# Patient Record
Sex: MALE | Race: WHITE | NOT HISPANIC OR LATINO | Employment: OTHER | RURAL
[De-identification: names, ages, dates, MRNs, and addresses within clinical notes are randomized per-mention and may not be internally consistent; named-entity substitution may affect disease eponyms.]

---

## 2022-10-18 ENCOUNTER — OFFICE VISIT (OUTPATIENT)
Dept: INTERNAL MEDICINE | Facility: CLINIC | Age: 72
End: 2022-10-18
Payer: MEDICARE

## 2022-10-18 VITALS
WEIGHT: 182 LBS | RESPIRATION RATE: 16 BRPM | BODY MASS INDEX: 25.48 KG/M2 | OXYGEN SATURATION: 97 % | HEART RATE: 83 BPM | DIASTOLIC BLOOD PRESSURE: 60 MMHG | HEIGHT: 71 IN | SYSTOLIC BLOOD PRESSURE: 125 MMHG

## 2022-10-18 DIAGNOSIS — Z86.73 HISTORY OF STROKE: ICD-10-CM

## 2022-10-18 DIAGNOSIS — S76.311A STRAIN OF RIGHT HAMSTRING MUSCLE, INITIAL ENCOUNTER: ICD-10-CM

## 2022-10-18 DIAGNOSIS — E03.9 HYPOTHYROIDISM, UNSPECIFIED TYPE: ICD-10-CM

## 2022-10-18 DIAGNOSIS — E78.5 DYSLIPIDEMIA: ICD-10-CM

## 2022-10-18 DIAGNOSIS — R47.01 EXPRESSIVE APHASIA: ICD-10-CM

## 2022-10-18 DIAGNOSIS — Z76.89 ENCOUNTER TO ESTABLISH CARE WITH NEW DOCTOR: Primary | ICD-10-CM

## 2022-10-18 DIAGNOSIS — Z85.528 HISTORY OF RENAL CELL CARCINOMA: ICD-10-CM

## 2022-10-18 PROCEDURE — 99204 PR OFFICE/OUTPT VISIT, NEW, LEVL IV, 45-59 MIN: ICD-10-PCS | Mod: S$PBB,25,, | Performed by: INTERNAL MEDICINE

## 2022-10-18 PROCEDURE — 99204 OFFICE O/P NEW MOD 45 MIN: CPT | Mod: S$PBB,25,, | Performed by: INTERNAL MEDICINE

## 2022-10-18 PROCEDURE — G0008 ADMIN INFLUENZA VIRUS VAC: HCPCS | Mod: PBBFAC | Performed by: INTERNAL MEDICINE

## 2022-10-18 PROCEDURE — 99205 OFFICE O/P NEW HI 60 MIN: CPT | Mod: PBBFAC,25 | Performed by: INTERNAL MEDICINE

## 2022-10-18 PROCEDURE — 96372 THER/PROPH/DIAG INJ SC/IM: CPT | Mod: PBBFAC,59 | Performed by: INTERNAL MEDICINE

## 2022-10-18 RX ORDER — ATORVASTATIN CALCIUM 80 MG/1
80 TABLET, FILM COATED ORAL DAILY
COMMUNITY

## 2022-10-18 RX ORDER — ACETAMINOPHEN 325 MG/1
325 TABLET ORAL EVERY 6 HOURS PRN
COMMUNITY

## 2022-10-18 RX ORDER — LEVOTHYROXINE SODIUM 150 UG/1
150 TABLET ORAL EVERY OTHER DAY
COMMUNITY
End: 2022-12-19 | Stop reason: SDUPTHER

## 2022-10-18 RX ORDER — ALFUZOSIN HYDROCHLORIDE 10 MG/1
10 TABLET, EXTENDED RELEASE ORAL
COMMUNITY
End: 2023-06-26 | Stop reason: SDUPTHER

## 2022-10-18 RX ORDER — DEXAMETHASONE SODIUM PHOSPHATE 4 MG/ML
4 INJECTION, SOLUTION INTRA-ARTICULAR; INTRALESIONAL; INTRAMUSCULAR; INTRAVENOUS; SOFT TISSUE
Status: COMPLETED | OUTPATIENT
Start: 2022-10-18 | End: 2022-10-18

## 2022-10-18 RX ORDER — LEVOTHYROXINE SODIUM 137 UG/1
137 TABLET ORAL EVERY OTHER DAY
COMMUNITY
End: 2022-12-20 | Stop reason: SDUPTHER

## 2022-10-18 RX ORDER — CLOPIDOGREL BISULFATE 75 MG/1
75 TABLET ORAL DAILY
COMMUNITY
End: 2022-12-05 | Stop reason: SDUPTHER

## 2022-10-18 RX ORDER — ZINC GLUCONATE 50 MG
50 TABLET ORAL DAILY
COMMUNITY

## 2022-10-18 RX ORDER — FINASTERIDE 5 MG/1
5 TABLET, FILM COATED ORAL DAILY
COMMUNITY
End: 2022-11-17 | Stop reason: SDUPTHER

## 2022-10-18 RX ADMIN — METHYLPREDNISOLONE SODIUM SUCCINATE 40 MG: 40 INJECTION, POWDER, FOR SOLUTION INTRAMUSCULAR; INTRAVENOUS at 11:10

## 2022-10-18 RX ADMIN — DEXAMETHASONE SODIUM PHOSPHATE 4 MG: 4 INJECTION, SOLUTION INTRAMUSCULAR; INTRAVENOUS at 11:10

## 2022-10-18 NOTE — PROGRESS NOTES
Subjective:       Patient ID: Kerwin Wilson is a 72 y.o. female.    Chief Complaint: Establish Care (C/o left posterior thigh pain; )    The patient is a 73 yo male that presents today to establish care. He recently moved here from [place]. He has history of CVA with expressive aphasia, vascular dementia, dyslipidemia, hypothyroidism, RCC s/p right nephrectomy, and BPH. CVA was about 1 year ago. Not long after that a renal mass was found and the patient had right nephrectomy and some of the bladder removed. He could not tolerate chemo. Sai saw urology about 3-4 months ago. He is up to date on age appropriate health screenings. He complains of some left leg pain right at the gluteal cleft. The part that he says is tender is right at tendon insertion of one of the hamstring muscles. He does walk frequently but denies any injury. Today he is resting comfortably in no distress. He is afebrile and vital signs are stable.    Review of Systems   Constitutional:  Negative for appetite change, chills, fatigue and fever.   HENT:  Negative for nasal congestion, ear pain, hearing loss, sinus pressure/congestion and sore throat.    Eyes:  Negative for pain, redness and visual disturbance.   Respiratory:  Negative for apnea, cough, shortness of breath and wheezing.    Cardiovascular:  Negative for chest pain and palpitations.   Gastrointestinal:  Negative for abdominal pain, constipation, diarrhea and nausea.   Endocrine: Negative for cold intolerance, heat intolerance and polyuria.   Genitourinary:  Negative for dysuria and hematuria.   Musculoskeletal:  Positive for leg pain. Negative for arthralgias, back pain, joint swelling, myalgias and neck pain.   Integumentary:  Negative for pallor, rash and wound.   Allergic/Immunologic: Negative for immunocompromised state.   Neurological:  Positive for speech difficulty and memory loss. Negative for tremors, seizures, weakness and headaches.   Hematological:  Negative for  adenopathy.   Psychiatric/Behavioral:  Negative for confusion, dysphoric mood and sleep disturbance. The patient is not nervous/anxious.        Objective:      Physical Exam  Vitals and nursing note reviewed.   Constitutional:       General: She is not in acute distress.     Appearance: Normal appearance. She is not ill-appearing.   HENT:      Head: Normocephalic and atraumatic.      Right Ear: External ear normal.      Left Ear: External ear normal.      Nose: Nose normal.      Mouth/Throat:      Pharynx: Oropharynx is clear.   Eyes:      Extraocular Movements: Extraocular movements intact.      Conjunctiva/sclera: Conjunctivae normal.      Pupils: Pupils are equal, round, and reactive to light.   Neck:      Vascular: No carotid bruit.   Cardiovascular:      Rate and Rhythm: Normal rate and regular rhythm.      Pulses: Normal pulses.      Heart sounds: Normal heart sounds. No murmur heard.  Pulmonary:      Effort: No respiratory distress.      Breath sounds: Normal breath sounds. No wheezing or rales.   Abdominal:      General: Bowel sounds are normal.      Palpations: Abdomen is soft.   Musculoskeletal:         General: Normal range of motion.      Cervical back: Normal range of motion and neck supple.      Right lower leg: No edema.      Left lower leg: No edema.   Skin:     General: Skin is warm and dry.      Capillary Refill: Capillary refill takes less than 2 seconds.      Coloration: Skin is not pale.   Neurological:      General: No focal deficit present.      Mental Status: She is alert and oriented to person, place, and time.      Cranial Nerves: No cranial nerve deficit.      Sensory: No sensory deficit.      Motor: No weakness.      Gait: Gait normal.      Comments: Some expressive aphasia   Psychiatric:         Mood and Affect: Mood normal.         Judgment: Judgment normal.       Assessment:       Problem List Items Addressed This Visit          Neuro    History of stroke    Expressive aphasia        Cardiac/Vascular    Dyslipidemia    Relevant Orders    Lipid Panel       Oncology    History of renal cell carcinoma    Relevant Orders    CBC Auto Differential (Completed)    Comprehensive Metabolic Panel    Ambulatory referral/consult to Urology       Endocrine    Hypothyroidism       Orthopedic    Right hamstring muscle strain     Other Visit Diagnoses       Encounter to establish care with new doctor    -  Primary              Plan:       The patient presents today to establish care. He recently moved her from [place]. He has some family here. He is up to date on age appropriate health maintenance issues. We are going to check some lab work today.    2. Hx of CVA- with residual expressive aphasia. He is on plavix, statin. No focal deficit and he is exercising frequently. He also has some dementia that is thought to be vascular.     3. Dyslipidemia- the patient is on high intensity statin. Continue with current care    4. Hypothyroidism- He is on alternating doses of synthroid 137 mcg and 150 mcg every other day. Continue with current care. Appears euthyroid clinically.    5. History of renal cell carcinoma- s/p nephrectomy on the right and some of his bladder also resected. He will need to establish with urology here so we will make that referral. He is on uroxatral 10 mg daily and proscar 5 mg daily    6. Right hamstring muscle strain. Has an allergy to NSAIDS. Will treat with steroids to see if this will help with inflammation. Ice as needed, good stretching, and rest as needed    RTC in 4 months

## 2022-11-03 ENCOUNTER — TELEPHONE (OUTPATIENT)
Dept: INTERNAL MEDICINE | Facility: CLINIC | Age: 72
End: 2022-11-03
Payer: MEDICARE

## 2022-11-03 NOTE — TELEPHONE ENCOUNTER
----- Message from Jessica Fuentes sent at 11/3/2022 11:12 AM CDT -----  Patient sister Lenore Brar called  to follow up on referral for neurology. Please call # [phone]

## 2022-11-03 NOTE — TELEPHONE ENCOUNTER
Called urology regarding referral. Appt date and time was given. Pts sister was called and notified of appt for 12/01/2022 @1300 with KRISTIN Otero NP.

## 2022-11-09 DIAGNOSIS — Z71.89 COMPLEX CARE COORDINATION: ICD-10-CM

## 2022-11-17 RX ORDER — FINASTERIDE 5 MG/1
5 TABLET, FILM COATED ORAL DAILY
Qty: 90 TABLET | Refills: 3 | Status: SHIPPED | OUTPATIENT
Start: 2022-11-17

## 2022-11-28 ENCOUNTER — TELEPHONE (OUTPATIENT)
Dept: INTERNAL MEDICINE | Facility: CLINIC | Age: 72
End: 2022-11-28

## 2022-11-28 DIAGNOSIS — M77.9 TENDONITIS: Primary | ICD-10-CM

## 2022-11-28 NOTE — TELEPHONE ENCOUNTER
Pts sister stated he has been taking tylenol.  States the pain is left posterior upper leg. After speaking with Dr. Leblanc, we are referring to ortho. Pts sister notified.

## 2022-11-28 NOTE — TELEPHONE ENCOUNTER
----- Message from Jessica Fuentes sent at 11/28/2022  3:32 PM CST -----  Patient sister Pat need a nurse to give her a call concerning her brother.

## 2022-12-02 ENCOUNTER — HOSPITAL ENCOUNTER (OUTPATIENT)
Dept: RADIOLOGY | Facility: HOSPITAL | Age: 72
Discharge: HOME | End: 2022-12-02
Attending: ORTHOPAEDIC SURGERY
Payer: MEDICARE

## 2022-12-02 DIAGNOSIS — M79.605 LEG PAIN, LEFT: ICD-10-CM

## 2022-12-02 DIAGNOSIS — M79.604 LEG PAIN, RIGHT: ICD-10-CM

## 2022-12-02 DIAGNOSIS — D89.2 SERUM GAMMA GLOBULIN INCREASED: Primary | ICD-10-CM

## 2022-12-02 PROBLEM — M25.552 LEFT HIP PAIN: Status: ACTIVE | Noted: 2022-12-02

## 2022-12-02 PROCEDURE — 73552 X-RAY EXAM OF FEMUR 2/>: CPT | Mod: TC,LT

## 2022-12-05 RX ORDER — CLOPIDOGREL BISULFATE 75 MG/1
75 TABLET ORAL DAILY
Qty: 90 TABLET | Refills: 3 | Status: SHIPPED | OUTPATIENT
Start: 2022-12-05

## 2022-12-09 ENCOUNTER — HOSPITAL ENCOUNTER (OUTPATIENT)
Dept: RADIOLOGY | Facility: HOSPITAL | Age: 72
Discharge: HOME | End: 2022-12-09
Attending: ORTHOPAEDIC SURGERY
Payer: MEDICARE

## 2022-12-09 DIAGNOSIS — M25.552 LEFT HIP PAIN: ICD-10-CM

## 2022-12-09 PROCEDURE — 78306 BONE IMAGING WHOLE BODY: CPT | Mod: TC

## 2022-12-09 PROCEDURE — 78306 NM BONE SCAN WHOLE BODY: ICD-10-PCS | Mod: 26,,, | Performed by: STUDENT IN AN ORGANIZED HEALTH CARE EDUCATION/TRAINING PROGRAM

## 2022-12-09 PROCEDURE — 78306 BONE IMAGING WHOLE BODY: CPT | Mod: 26,,, | Performed by: STUDENT IN AN ORGANIZED HEALTH CARE EDUCATION/TRAINING PROGRAM

## 2022-12-09 PROCEDURE — A9503 TC99M MEDRONATE: HCPCS

## 2022-12-19 RX ORDER — LEVOTHYROXINE SODIUM 150 UG/1
150 TABLET ORAL EVERY OTHER DAY
Qty: 90 TABLET | Refills: 3 | Status: SHIPPED | OUTPATIENT
Start: 2022-12-19

## 2022-12-19 NOTE — TELEPHONE ENCOUNTER
----- Message from Jessica Fuentes sent at 12/16/2022  1:31 PM CST -----  NEED REFILL ON LEVOTHYROXINE

## 2022-12-20 RX ORDER — LEVOTHYROXINE SODIUM 137 UG/1
137 TABLET ORAL EVERY OTHER DAY
Qty: 45 TABLET | Refills: 3 | Status: SHIPPED | OUTPATIENT
Start: 2022-12-20

## 2022-12-29 ENCOUNTER — OFFICE VISIT (OUTPATIENT)
Dept: UROLOGY | Facility: CLINIC | Age: 72
End: 2022-12-29
Payer: MEDICARE

## 2022-12-29 VITALS
OXYGEN SATURATION: 96 % | TEMPERATURE: 98 F | HEART RATE: 100 BPM | HEIGHT: 72 IN | BODY MASS INDEX: 22.35 KG/M2 | SYSTOLIC BLOOD PRESSURE: 100 MMHG | DIASTOLIC BLOOD PRESSURE: 64 MMHG | WEIGHT: 165 LBS

## 2022-12-29 DIAGNOSIS — Z90.5 HISTORY OF RIGHT NEPHRECTOMY: ICD-10-CM

## 2022-12-29 DIAGNOSIS — M79.605 LEFT LEG PAIN: ICD-10-CM

## 2022-12-29 DIAGNOSIS — Z85.528 HISTORY OF RENAL CELL CARCINOMA: Primary | ICD-10-CM

## 2022-12-29 DIAGNOSIS — N28.89 OTHER SPECIFIED DISORDERS OF KIDNEY AND URETER: ICD-10-CM

## 2022-12-29 PROCEDURE — 3078F PR MOST RECENT DIASTOLIC BLOOD PRESSURE < 80 MM HG: ICD-10-PCS | Mod: CPTII,,, | Performed by: NURSE PRACTITIONER

## 2022-12-29 PROCEDURE — 3078F DIAST BP <80 MM HG: CPT | Mod: CPTII,,, | Performed by: NURSE PRACTITIONER

## 2022-12-29 PROCEDURE — 3008F PR BODY MASS INDEX (BMI) DOCUMENTED: ICD-10-PCS | Mod: CPTII,,, | Performed by: NURSE PRACTITIONER

## 2022-12-29 PROCEDURE — 99215 OFFICE O/P EST HI 40 MIN: CPT | Mod: PBBFAC | Performed by: NURSE PRACTITIONER

## 2022-12-29 PROCEDURE — 1160F RVW MEDS BY RX/DR IN RCRD: CPT | Mod: CPTII,,, | Performed by: NURSE PRACTITIONER

## 2022-12-29 PROCEDURE — 87086 CULTURE, URINE: ICD-10-PCS | Mod: GZ,,, | Performed by: CLINICAL MEDICAL LABORATORY

## 2022-12-29 PROCEDURE — 3008F BODY MASS INDEX DOCD: CPT | Mod: CPTII,,, | Performed by: NURSE PRACTITIONER

## 2022-12-29 PROCEDURE — 81001 URINALYSIS AUTO W/SCOPE: CPT | Mod: ,,, | Performed by: CLINICAL MEDICAL LABORATORY

## 2022-12-29 PROCEDURE — 81001 URINALYSIS, REFLEX TO URINE CULTURE: ICD-10-PCS | Mod: ,,, | Performed by: CLINICAL MEDICAL LABORATORY

## 2022-12-29 PROCEDURE — 99203 PR OFFICE/OUTPT VISIT, NEW, LEVL III, 30-44 MIN: ICD-10-PCS | Mod: S$PBB,,, | Performed by: NURSE PRACTITIONER

## 2022-12-29 PROCEDURE — 99203 OFFICE O/P NEW LOW 30 MIN: CPT | Mod: S$PBB,,, | Performed by: NURSE PRACTITIONER

## 2022-12-29 PROCEDURE — 3074F SYST BP LT 130 MM HG: CPT | Mod: CPTII,,, | Performed by: NURSE PRACTITIONER

## 2022-12-29 PROCEDURE — 1160F PR REVIEW ALL MEDS BY PRESCRIBER/CLIN PHARMACIST DOCUMENTED: ICD-10-PCS | Mod: CPTII,,, | Performed by: NURSE PRACTITIONER

## 2022-12-29 PROCEDURE — 3074F PR MOST RECENT SYSTOLIC BLOOD PRESSURE < 130 MM HG: ICD-10-PCS | Mod: CPTII,,, | Performed by: NURSE PRACTITIONER

## 2022-12-29 PROCEDURE — 1159F MED LIST DOCD IN RCRD: CPT | Mod: CPTII,,, | Performed by: NURSE PRACTITIONER

## 2022-12-29 PROCEDURE — 87086 URINE CULTURE/COLONY COUNT: CPT | Mod: GZ,,, | Performed by: CLINICAL MEDICAL LABORATORY

## 2022-12-29 PROCEDURE — 1159F PR MEDICATION LIST DOCUMENTED IN MEDICAL RECORD: ICD-10-PCS | Mod: CPTII,,, | Performed by: NURSE PRACTITIONER

## 2022-12-29 RX ORDER — GENTAMICIN SULFATE 40 MG/ML
80 INJECTION, SOLUTION INTRAMUSCULAR; INTRAVENOUS ONCE
Status: DISCONTINUED | OUTPATIENT
Start: 2022-12-30 | End: 2022-12-29

## 2022-12-29 NOTE — ASSESSMENT & PLAN NOTE
· Consult with Dr Modi Urology ASAP  · F/u with me after CT Abdomen with IV contrast to review  · F/u with Dr. Wiggins

## 2022-12-29 NOTE — PROGRESS NOTES
Mr. Wilson is a pleasant 73 yo gentleman who has been referred for abnormal bone scan recently obtained which is worrisome for metastatic disease.  He has a known history of right Renal Cell Cancer,  new findings worrisome for his left kidney.  Right nephrectomy was performed at [facility] in [facility] January of this year.  Patient moved to [place] in September of this year.  He obtained appt with Dr. Leblanc for left hip and upper leg pain, which resulted in an orthopedic consult with Dr. Freeman, Orthopedics, who obtained MRI and then Bone Scan  Patient states he had CVA last year this time, and that the Renal Cancer was found incidentally during that hospitalization.  He has had a consult with Dr. Wiggins, Oncology, who feels that   Has appt for PET Scan and is to begin radiation for this.  Patient denies hematuria or left renal pain.  Patient states that up until November, he was walking every day at [facility] and had a sudden onset of this left hip and pelvic pain.        NM BONE SCAN WHOLE BODY     COMPARISON:  12/2/22     FINDINGS:  Focal radiotracer uptake is associated with the right inferior bony calvarium, nonspecific.  CT of the brain without contrast or skull radiographs recommended.     Focal radiotracer uptake overlying the anterior cervical spine.     There is focal radiotracer uptake associated with the medial right 8th rib.  Focal radiotracer uptake associated with the medial left 7th rib.     Focal radiotracer uptake associated with the posterior L4 vertebral body.     There is abnormal radiotracer uptake associated with the left renal collecting system.  There is radiotracer uptake within the left ureter and urinary bladder.     There is no radiotracer uptake associated with the right kidney.     Focal radiotracer uptake is associated with the anterior/posterior left-sided pubic bone.     Degenerative uptake of the knees and left foot.     Impression:     Focal radiotracer uptake is  "associated with the right inferior bony calvarium, nonspecific.  CT of the brain without contrast or skull radiographs recommended.     Multifocal radiotracer uptake associated with overlying the anterior cervical spine, multiple ribs, the posterior lumbar spine and the left pubic bone, findings which can be seen in metastatic disease.  CT chest, abdomen and pelvis with intravenous contrast this recommended.     Abnormal radiotracer uptake associated with the left renal collecting system.  CT of the abdomen and pelvis with intravenous contrast is recommended.        Electronically signed by: Juan Pablo Smith  Date:                                            12/09/2022  Time:                                           16:17    ------------------------------------------------------------------    MRI imaging 12/2/22 by Orthopedics:  "Abnormal signal involving the posterior acetabulum, ischium and inferior pubic ramus.  There is extension of signal abnormality into the adjacent soft tissues beyond the cortical borders, findings consistent with malignant process.  When correlated bone scan this could represent metastatic disease from another lesion versus primary bone tumor.  There is edema in the adjacent muscles and moderate hip effusion."  "

## 2022-12-30 ENCOUNTER — TELEPHONE (OUTPATIENT)
Dept: UROLOGY | Facility: CLINIC | Age: 72
End: 2022-12-30

## 2022-12-30 LAB
BACTERIA #/AREA URNS HPF: ABNORMAL /HPF
BILIRUB UR QL STRIP: NEGATIVE
CLARITY UR: ABNORMAL
COLOR UR: YELLOW
GLUCOSE UR STRIP-MCNC: NORMAL MG/DL
KETONES UR STRIP-SCNC: NEGATIVE MG/DL
LEUKOCYTE ESTERASE UR QL STRIP: ABNORMAL
NITRITE UR QL STRIP: NEGATIVE
PH UR STRIP: 6 PH UNITS
PROT UR QL STRIP: NEGATIVE
RBC # UR STRIP: NEGATIVE /UL
RBC #/AREA URNS HPF: 4 /HPF
SP GR UR STRIP: 1.01
SQUAMOUS #/AREA URNS LPF: ABNORMAL /HPF
UROBILINOGEN UR STRIP-ACNC: NORMAL MG/DL
WBC #/AREA URNS HPF: >182 /HPF
WBC CLUMPS, UA: ABNORMAL /HPF

## 2022-12-30 NOTE — TELEPHONE ENCOUNTER
----- Message from SID Ford sent at 12/30/2022  8:19 AM CST -----  Sodium is low, recommend adding table salt to diet and f/u with PCP for this.  Glucose is elevated, f/u with PCP  Renal function is still abnormal but improved slightly since last checked 2 months ago.  Stay well hydrated   Calcium is high, will forward study to Oncology for their review.  I called Dr Wiggins's office and spoke with Nuha and got fax number [phone] and faxed this report to him for his review.

## 2022-12-30 NOTE — TELEPHONE ENCOUNTER
----- Message from SID Ford sent at 12/30/2022  8:19 AM CST -----  Sodium is low, recommend adding table salt to diet and f/u with PCP for this.  Glucose is elevated, f/u with PCP  Renal function is still abnormal but improved slightly since last checked 2 months ago.  Stay well hydrated   Calcium is high, will forward study to Oncology for their review.  I called and spoke with the sister CG.  Informed her of the above message.  Told her we will send the labs to Dr Wiggins for review.  She voiced understanding.  She said she will be glad to add a little salt to his food, because he is always telling her no to add much.  She said she will relay the information to the pt.

## 2023-01-01 LAB — UA COMPLETE W REFLEX CULTURE PNL UR: NO GROWTH

## 2023-01-03 ENCOUNTER — TELEPHONE (OUTPATIENT)
Dept: UROLOGY | Facility: CLINIC | Age: 73
End: 2023-01-03

## 2023-01-03 NOTE — TELEPHONE ENCOUNTER
----- Message from SID Ford sent at 1/3/2023  8:04 AM CST -----  No UTI per culture.  I called pt and spoke with his sister and gave her the information above:  no UTI.  She voiced understanding.

## 2023-01-10 ENCOUNTER — HOSPITAL ENCOUNTER (OUTPATIENT)
Dept: RADIOLOGY | Facility: HOSPITAL | Age: 73
Discharge: HOME | End: 2023-01-10
Attending: NURSE PRACTITIONER
Payer: MEDICARE

## 2023-01-10 DIAGNOSIS — N28.89 OTHER SPECIFIED DISORDERS OF KIDNEY AND URETER: ICD-10-CM

## 2023-01-10 PROCEDURE — 25500020 PHARM REV CODE 255: Performed by: NURSE PRACTITIONER

## 2023-01-10 PROCEDURE — 74177 CT ABD & PELVIS W/CONTRAST: CPT | Mod: 26,,, | Performed by: RADIOLOGY

## 2023-01-10 PROCEDURE — 74177 CT ABDOMEN PELVIS WITH CONTRAST: ICD-10-PCS | Mod: 26,,, | Performed by: RADIOLOGY

## 2023-01-10 PROCEDURE — 74177 CT ABD & PELVIS W/CONTRAST: CPT | Mod: TC

## 2023-01-10 RX ADMIN — IOPAMIDOL 50 ML: 755 INJECTION, SOLUTION INTRAVENOUS at 09:01

## 2023-01-11 ENCOUNTER — TELEPHONE (OUTPATIENT)
Dept: UROLOGY | Facility: CLINIC | Age: 73
End: 2023-01-11

## 2023-01-11 NOTE — TELEPHONE ENCOUNTER
----- Message from SID Ford sent at 1/11/2023  3:28 PM CST -----  Alert patient that CT imaging shows multiple liver lesions;  he has left hydronephrosis which needs stenting.  I have contacted Dr. Modi's office to see if he can be worked in asap, as this requires attention sooner than patient's scheduled appt on Feb. 6th.  I called and spoke with the pt sister and relayed the above information to her.  She voiced understanding.

## 2023-01-11 NOTE — TELEPHONE ENCOUNTER
----- Message from SID Ford sent at 1/11/2023  6:37 AM CST -----  -    -  Please check with patient to see if he has appt with Dr. Luther.  Please let me know so I know how to proceed.  I called and spoke with sister Ms Sanches and she says he does have appointment with Dr luther on 2-6-2023.  I will relay this to TRISH Otero.

## 2023-01-12 LAB — CREAT SERPL-MCNC: 1.6 MG/DL (ref 0.6–1.3)

## 2023-01-20 ENCOUNTER — TELEPHONE (OUTPATIENT)
Dept: INTERNAL MEDICINE | Facility: CLINIC | Age: 73
End: 2023-01-20

## 2023-01-20 NOTE — TELEPHONE ENCOUNTER
Spoke to pts granddaughter who is DPOA for mr. Wilson. She stated since she sent the fax she has been able to communicate with pts sister and wanted to give more info on pts hx. She states he has been diagnosed with dementia before he moved down here. She stated she is going to send the records to Dr. Leblanc so we can have this. She is going to speak to the sister about getting access to [employer]. If she is unable to get it we should be able to to add her proxy access. She is concerned about him getting chemo/ radiation with him dementia dx. I explained to her that we can get these records sent to oncologist once we have received them. She thanked me.

## 2023-02-23 ENCOUNTER — OFFICE VISIT (OUTPATIENT)
Dept: INTERNAL MEDICINE | Facility: CLINIC | Age: 73
End: 2023-02-23
Payer: MEDICARE

## 2023-02-23 VITALS
RESPIRATION RATE: 18 BRPM | HEART RATE: 72 BPM | DIASTOLIC BLOOD PRESSURE: 52 MMHG | BODY MASS INDEX: 22.35 KG/M2 | OXYGEN SATURATION: 100 % | TEMPERATURE: 98 F | HEIGHT: 72 IN | WEIGHT: 165 LBS | SYSTOLIC BLOOD PRESSURE: 107 MMHG

## 2023-02-23 DIAGNOSIS — Z09 FOLLOW-UP EXAM: Primary | ICD-10-CM

## 2023-02-23 DIAGNOSIS — E78.5 DYSLIPIDEMIA: ICD-10-CM

## 2023-02-23 DIAGNOSIS — R47.01 EXPRESSIVE APHASIA: ICD-10-CM

## 2023-02-23 DIAGNOSIS — E03.9 HYPOTHYROIDISM, UNSPECIFIED TYPE: ICD-10-CM

## 2023-02-23 DIAGNOSIS — Z85.528 HISTORY OF RENAL CELL CARCINOMA: ICD-10-CM

## 2023-02-23 DIAGNOSIS — Z86.73 HISTORY OF STROKE: ICD-10-CM

## 2023-02-23 DIAGNOSIS — Z90.5 HISTORY OF RIGHT NEPHRECTOMY: ICD-10-CM

## 2023-02-23 DIAGNOSIS — M25.552 LEFT HIP PAIN: ICD-10-CM

## 2023-02-23 PROCEDURE — 3074F SYST BP LT 130 MM HG: CPT | Mod: CPTII,,, | Performed by: INTERNAL MEDICINE

## 2023-02-23 PROCEDURE — 99215 OFFICE O/P EST HI 40 MIN: CPT | Mod: PBBFAC,25 | Performed by: INTERNAL MEDICINE

## 2023-02-23 PROCEDURE — 1101F PT FALLS ASSESS-DOCD LE1/YR: CPT | Mod: CPTII,,, | Performed by: INTERNAL MEDICINE

## 2023-02-23 PROCEDURE — 3078F PR MOST RECENT DIASTOLIC BLOOD PRESSURE < 80 MM HG: ICD-10-PCS | Mod: CPTII,,, | Performed by: INTERNAL MEDICINE

## 2023-02-23 PROCEDURE — 3288F FALL RISK ASSESSMENT DOCD: CPT | Mod: CPTII,,, | Performed by: INTERNAL MEDICINE

## 2023-02-23 PROCEDURE — 99214 PR OFFICE/OUTPT VISIT, EST, LEVL IV, 30-39 MIN: ICD-10-PCS | Mod: S$PBB,,, | Performed by: INTERNAL MEDICINE

## 2023-02-23 PROCEDURE — 3008F BODY MASS INDEX DOCD: CPT | Mod: CPTII,,, | Performed by: INTERNAL MEDICINE

## 2023-02-23 PROCEDURE — 3074F PR MOST RECENT SYSTOLIC BLOOD PRESSURE < 130 MM HG: ICD-10-PCS | Mod: CPTII,,, | Performed by: INTERNAL MEDICINE

## 2023-02-23 PROCEDURE — 3078F DIAST BP <80 MM HG: CPT | Mod: CPTII,,, | Performed by: INTERNAL MEDICINE

## 2023-02-23 PROCEDURE — 1159F PR MEDICATION LIST DOCUMENTED IN MEDICAL RECORD: ICD-10-PCS | Mod: CPTII,,, | Performed by: INTERNAL MEDICINE

## 2023-02-23 PROCEDURE — 1159F MED LIST DOCD IN RCRD: CPT | Mod: CPTII,,, | Performed by: INTERNAL MEDICINE

## 2023-02-23 PROCEDURE — 96372 THER/PROPH/DIAG INJ SC/IM: CPT | Mod: PBBFAC | Performed by: INTERNAL MEDICINE

## 2023-02-23 PROCEDURE — 99214 OFFICE O/P EST MOD 30 MIN: CPT | Mod: S$PBB,,, | Performed by: INTERNAL MEDICINE

## 2023-02-23 PROCEDURE — 3008F PR BODY MASS INDEX (BMI) DOCUMENTED: ICD-10-PCS | Mod: CPTII,,, | Performed by: INTERNAL MEDICINE

## 2023-02-23 PROCEDURE — 1126F PR PAIN SEVERITY QUANTIFIED, NO PAIN PRESENT: ICD-10-PCS | Mod: CPTII,,, | Performed by: INTERNAL MEDICINE

## 2023-02-23 PROCEDURE — 1101F PR PT FALLS ASSESS DOC 0-1 FALLS W/OUT INJ PAST YR: ICD-10-PCS | Mod: CPTII,,, | Performed by: INTERNAL MEDICINE

## 2023-02-23 PROCEDURE — 1126F AMNT PAIN NOTED NONE PRSNT: CPT | Mod: CPTII,,, | Performed by: INTERNAL MEDICINE

## 2023-02-23 PROCEDURE — 3288F PR FALLS RISK ASSESSMENT DOCUMENTED: ICD-10-PCS | Mod: CPTII,,, | Performed by: INTERNAL MEDICINE

## 2023-02-23 RX ORDER — CYANOCOBALAMIN 1000 UG/ML
1000 INJECTION, SOLUTION INTRAMUSCULAR; SUBCUTANEOUS
Status: COMPLETED | OUTPATIENT
Start: 2023-02-23 | End: 2023-02-23

## 2023-02-23 RX ORDER — ONDANSETRON HYDROCHLORIDE 8 MG/1
TABLET, FILM COATED ORAL
COMMUNITY
Start: 2023-02-06 | End: 2023-06-26 | Stop reason: SDUPTHER

## 2023-02-23 RX ORDER — CEPHALEXIN 500 MG/1
500 CAPSULE ORAL EVERY 12 HOURS
Status: ON HOLD | COMMUNITY
Start: 2023-01-30 | End: 2023-06-28 | Stop reason: HOSPADM

## 2023-02-23 RX ORDER — HYDROCODONE BITARTRATE AND ACETAMINOPHEN 5; 325 MG/1; MG/1
1 TABLET ORAL EVERY 4 HOURS PRN
COMMUNITY
Start: 2023-01-30 | End: 2023-06-26

## 2023-02-23 RX ADMIN — CYANOCOBALAMIN 1000 MCG: 1000 INJECTION, SOLUTION INTRAMUSCULAR; SUBCUTANEOUS at 09:02

## 2023-02-23 NOTE — PROGRESS NOTES
Subjective:       Patient ID: Kerwin Wilson is a 73 y.o. male.    Chief Complaint: Follow-up (4 month F/U, received ten doses of radiation, 2 rounds of chemo, a port placed. Recent bladder surgery)    10/18/22- The patient is a 71 yo male that presents today to establish care. He recently moved here from [place]. He has history of CVA with expressive aphasia, vascular dementia, dyslipidemia, hypothyroidism, RCC s/p right nephrectomy, and BPH. CVA was about 1 year ago. Not long after that a renal mass was found and the patient had right nephrectomy and some of the bladder removed. He could not tolerate chemo. Las saw urology about 3-4 months ago. He is up to date on age appropriate health screenings. He complains of some left leg pain right at the gluteal cleft. The part that he says is tender is right at tendon insertion of one of the hamstring muscles. He does walk frequently but denies any injury. Today he is resting comfortably in no distress. He is afebrile and vital signs are stable.    2/23/23- Patient presents today for follow up. He has history of CVA with expressive aphasia, vascular dementia, dyslipidemia, hypothyroidism, RCC s/p right nephrectomy, and BPH. He is also currently undergoing radiation and chemotherapy for metastatic urothelial cancer. He does report some generalized weakness and fatigue since beginning this treatment. He complains of some intermittent constipation related to previously prescribed short-term Hydrocodone use that was managed well with daily Miralax. He has discontinued use of the Hydrocodone but does report some continued constipation over the past 2 days and states he resumed the Miralax this morning. He is resting comfortably during the exam and appears in no distress.  Appetite has decreased some and weight is down about 15 lb since I last saw him. He has now completed his radiation treatments.    Follow-up  Associated symptoms include fatigue and weakness. Pertinent  negatives include no abdominal pain, arthralgias, chest pain, chills, congestion, coughing, fever, headaches, joint swelling, myalgias, nausea, neck pain, rash or sore throat.   Review of Systems   Constitutional:  Positive for fatigue. Negative for appetite change, chills and fever.   HENT:  Negative for nasal congestion, ear pain, hearing loss, sinus pressure/congestion and sore throat.    Eyes:  Negative for pain, redness and visual disturbance.   Respiratory:  Negative for apnea, cough, shortness of breath and wheezing.    Cardiovascular:  Negative for chest pain and palpitations.   Gastrointestinal:  Negative for abdominal pain, constipation, diarrhea and nausea.   Endocrine: Negative for cold intolerance, heat intolerance and polyuria.   Genitourinary:  Negative for dysuria and hematuria.   Musculoskeletal:  Positive for leg pain. Negative for arthralgias, back pain, joint swelling, myalgias and neck pain.   Integumentary:  Negative for pallor, rash and wound.   Neurological:  Positive for weakness and memory loss. Negative for tremors, seizures and headaches.   Hematological:  Negative for adenopathy.   Psychiatric/Behavioral:  Negative for confusion, dysphoric mood and sleep disturbance. The patient is not nervous/anxious.        Objective:      Physical Exam  Vitals and nursing note reviewed.   Constitutional:       General: He is not in acute distress.     Appearance: Normal appearance. He is not ill-appearing.   HENT:      Head: Normocephalic and atraumatic.      Right Ear: External ear normal.      Left Ear: External ear normal.      Nose: Nose normal.      Mouth/Throat:      Pharynx: Oropharynx is clear.   Eyes:      Extraocular Movements: Extraocular movements intact.      Conjunctiva/sclera: Conjunctivae normal.      Pupils: Pupils are equal, round, and reactive to light.   Neck:      Vascular: No carotid bruit.   Cardiovascular:      Rate and Rhythm: Normal rate and regular rhythm.      Pulses:  Normal pulses.      Heart sounds: Normal heart sounds. No murmur heard.  Pulmonary:      Effort: No respiratory distress.      Breath sounds: Normal breath sounds. No wheezing or rales.   Abdominal:      General: Bowel sounds are normal.      Palpations: Abdomen is soft.   Musculoskeletal:         General: Normal range of motion.      Cervical back: Normal range of motion and neck supple.      Right lower leg: No edema.      Left lower leg: No edema.   Skin:     General: Skin is warm and dry.      Capillary Refill: Capillary refill takes less than 2 seconds.      Coloration: Skin is not pale.   Neurological:      General: No focal deficit present.      Mental Status: He is alert and oriented to person, place, and time.      Cranial Nerves: No cranial nerve deficit.      Sensory: No sensory deficit.      Gait: Gait normal.      Comments: Some expressive aphasia   Psychiatric:         Mood and Affect: Mood normal.         Judgment: Judgment normal.       Assessment:       Problem List Items Addressed This Visit          Neuro    History of stroke    Expressive aphasia       Cardiac/Vascular    Dyslipidemia       Renal/    History of right nephrectomy       Oncology    History of renal cell carcinoma       Endocrine    Hypothyroidism     Other Visit Diagnoses       Follow-up exam    -  Primary              Plan:       1.The patient presents today to establish care. He recently moved her from [place]. He has some family here. He has metastatic urothelial cancer.        2/23/23 Patient presents today for follow up accompanied by family. He is currently undergoing radiation and chemotherapy for metastatic cancer and does report some generalized weakness and fatigue during this visit.     2. Hx of CVA- with residual expressive aphasia. He is on plavix, statin. No focal deficit and he is exercising frequently. He also has some dementia that is thought to be vascular.     3. Dyslipidemia- the patient is on high  intensity statin. Continue with current care    4. Hypothyroidism- He is on alternating doses of synthroid 137 mcg and 150 mcg every other day. Continue with current care. Appears euthyroid clinically.    5. History of renal cell carcinoma- s/p nephrectomy on the right and some of his bladder also resected. He will need to establish with urology here so we will make that referral. He is on uroxatral 10 mg daily and proscar 5 mg daily  2/23- Workup revealed metastatic disease. He underwent palliative radiation to left hip and has received 2 rounds of Chemo with Dr. Wiggins. We have discussed supplementing nutrition with boost or ensure    6. Moderate protein/nutrition deficiency- lost 15 lb since October of 2022. We are going to give b12 injection today and if it helps can give them monthly. We have discussed supplementing meals with booster ensure.    RTC in 6 months

## 2023-03-28 ENCOUNTER — TELEPHONE (OUTPATIENT)
Dept: INTERNAL MEDICINE | Facility: CLINIC | Age: 73
End: 2023-03-28

## 2023-03-28 ENCOUNTER — CLINICAL SUPPORT (OUTPATIENT)
Dept: INTERNAL MEDICINE | Facility: CLINIC | Age: 73
End: 2023-03-28
Payer: MEDICARE

## 2023-03-28 DIAGNOSIS — E63.9 NUTRITIONAL DEFICIENCY: Primary | ICD-10-CM

## 2023-03-28 PROCEDURE — 96372 THER/PROPH/DIAG INJ SC/IM: CPT | Mod: PBBFAC | Performed by: INTERNAL MEDICINE

## 2023-03-28 PROCEDURE — 99212 OFFICE O/P EST SF 10 MIN: CPT | Mod: PBBFAC

## 2023-03-28 RX ORDER — CYANOCOBALAMIN 1000 UG/ML
1000 INJECTION, SOLUTION INTRAMUSCULAR; SUBCUTANEOUS
Status: COMPLETED | OUTPATIENT
Start: 2023-03-28 | End: 2023-03-28

## 2023-03-28 RX ADMIN — CYANOCOBALAMIN 1000 MCG: 1000 INJECTION, SOLUTION INTRAMUSCULAR at 11:03

## 2023-03-28 NOTE — TELEPHONE ENCOUNTER
----- Message from Jessica Fuentes sent at 3/27/2023  3:46 PM CDT -----  PATIENT NEED A B12 SHOT

## 2023-03-28 NOTE — PROGRESS NOTES
Pt here for b12 injection. Pt tolerated injection with no complaints. No adverse reactions noted. Pt is going to call back and let clinic know if he wants to continue doing these monthly.

## 2023-05-12 ENCOUNTER — HOSPITAL ENCOUNTER (OUTPATIENT)
Dept: RADIOLOGY | Facility: HOSPITAL | Age: 73
Discharge: HOME | End: 2023-05-12
Attending: INTERNAL MEDICINE
Payer: MEDICARE

## 2023-05-12 DIAGNOSIS — C66.1 MALIGNANT NEOPLASM OF RIGHT URETER: ICD-10-CM

## 2023-05-12 LAB — CREAT SERPL-MCNC: 1.4 MG/DL (ref 0.6–1.3)

## 2023-05-12 PROCEDURE — 74177 CT ABD & PELVIS W/CONTRAST: CPT | Mod: 26,,, | Performed by: RADIOLOGY

## 2023-05-12 PROCEDURE — 74177 CT ABD & PELVIS W/CONTRAST: CPT | Mod: TC

## 2023-05-12 PROCEDURE — 74177 CT ABDOMEN PELVIS WITH CONTRAST: ICD-10-PCS | Mod: 26,,, | Performed by: RADIOLOGY

## 2023-05-12 PROCEDURE — 25500020 PHARM REV CODE 255: Performed by: INTERNAL MEDICINE

## 2023-05-12 PROCEDURE — 82565 ASSAY OF CREATININE: CPT

## 2023-05-12 RX ADMIN — IOPAMIDOL 100 ML: 755 INJECTION, SOLUTION INTRAVENOUS at 09:05

## 2023-06-09 DIAGNOSIS — Z71.89 COMPLEX CARE COORDINATION: ICD-10-CM

## 2023-06-21 ENCOUNTER — TELEPHONE (OUTPATIENT)
Dept: INTERNAL MEDICINE | Facility: CLINIC | Age: 73
End: 2023-06-21
Payer: MEDICARE

## 2023-06-21 NOTE — TELEPHONE ENCOUNTER
----- Message from Jessica Fuentes sent at 6/21/2023  3:12 PM CDT -----  Patient wife called stating the pharmacy is completely out of stock isalfuzosin (UROXATRAL) 10 mg Tb24.  If possible what other med can he take. Please give her a call back

## 2023-06-26 ENCOUNTER — HOSPITAL ENCOUNTER (INPATIENT)
Facility: HOSPITAL | Age: 73
LOS: 2 days | Discharge: HOME | DRG: 682 | End: 2023-06-28
Attending: FAMILY MEDICINE | Admitting: FAMILY MEDICINE
Payer: MEDICARE

## 2023-06-26 ENCOUNTER — OFFICE VISIT (OUTPATIENT)
Dept: INTERNAL MEDICINE | Facility: CLINIC | Age: 73
DRG: 682 | End: 2023-06-26
Payer: MEDICARE

## 2023-06-26 VITALS
RESPIRATION RATE: 18 BRPM | DIASTOLIC BLOOD PRESSURE: 58 MMHG | HEIGHT: 72 IN | SYSTOLIC BLOOD PRESSURE: 104 MMHG | OXYGEN SATURATION: 96 % | TEMPERATURE: 97 F | BODY MASS INDEX: 22.38 KG/M2 | HEART RATE: 119 BPM

## 2023-06-26 DIAGNOSIS — Z86.73 HISTORY OF STROKE: ICD-10-CM

## 2023-06-26 DIAGNOSIS — Z85.528 HISTORY OF RENAL CELL CARCINOMA: ICD-10-CM

## 2023-06-26 DIAGNOSIS — E86.0 DEHYDRATION: ICD-10-CM

## 2023-06-26 DIAGNOSIS — E78.5 DYSLIPIDEMIA: ICD-10-CM

## 2023-06-26 DIAGNOSIS — K12.30 MUCOSITIS: Primary | ICD-10-CM

## 2023-06-26 DIAGNOSIS — E03.9 HYPOTHYROIDISM, UNSPECIFIED TYPE: ICD-10-CM

## 2023-06-26 DIAGNOSIS — Z09 FOLLOW-UP EXAM: Primary | ICD-10-CM

## 2023-06-26 DIAGNOSIS — R07.9 CHEST PAIN: ICD-10-CM

## 2023-06-26 DIAGNOSIS — R13.10 DYSPHAGIA, UNSPECIFIED TYPE: ICD-10-CM

## 2023-06-26 DIAGNOSIS — R47.01 EXPRESSIVE APHASIA: ICD-10-CM

## 2023-06-26 DIAGNOSIS — N17.9 AKI (ACUTE KIDNEY INJURY): ICD-10-CM

## 2023-06-26 DIAGNOSIS — E44.0 MODERATE PROTEIN-CALORIE MALNUTRITION: ICD-10-CM

## 2023-06-26 PROBLEM — N18.9 ACUTE-ON-CHRONIC KIDNEY INJURY: Status: ACTIVE | Noted: 2023-06-26

## 2023-06-26 PROBLEM — N40.0 BPH (BENIGN PROSTATIC HYPERPLASIA): Status: ACTIVE | Noted: 2023-06-26

## 2023-06-26 PROBLEM — D72.829 LEUKOCYTOSIS: Status: ACTIVE | Noted: 2023-06-26

## 2023-06-26 LAB
ALBUMIN SERPL BCP-MCNC: 2.3 G/DL (ref 3.5–5)
ALBUMIN/GLOB SERPL: 0.4 {RATIO}
ALP SERPL-CCNC: 499 U/L (ref 45–115)
ALT SERPL W P-5'-P-CCNC: 113 U/L (ref 16–61)
ANION GAP SERPL CALCULATED.3IONS-SCNC: 21 MMOL/L (ref 7–16)
ANISOCYTOSIS BLD QL SMEAR: ABNORMAL
AST SERPL W P-5'-P-CCNC: 150 U/L (ref 15–37)
BASOPHILS # BLD AUTO: 0.05 K/UL (ref 0–0.2)
BASOPHILS NFR BLD AUTO: 0.2 % (ref 0–1)
BILIRUB SERPL-MCNC: 0.8 MG/DL (ref ?–1.2)
BUN SERPL-MCNC: 128 MG/DL (ref 7–18)
BUN/CREAT SERPL: 32 (ref 6–20)
CALCIUM SERPL-MCNC: 9.5 MG/DL (ref 8.5–10.1)
CHLORIDE SERPL-SCNC: 109 MMOL/L (ref 98–107)
CO2 SERPL-SCNC: 22 MMOL/L (ref 21–32)
CREAT SERPL-MCNC: 3.98 MG/DL (ref 0.7–1.3)
CRENATED CELLS: ABNORMAL
DIFFERENTIAL METHOD BLD: ABNORMAL
EGFR (NO RACE VARIABLE) (RUSH/TITUS): 15 ML/MIN/1.73M2
EOSINOPHIL # BLD AUTO: 0.01 K/UL (ref 0–0.5)
EOSINOPHIL NFR BLD AUTO: 0 % (ref 1–4)
ERYTHROCYTE [DISTWIDTH] IN BLOOD BY AUTOMATED COUNT: 15.2 % (ref 11.5–14.5)
GLOBULIN SER-MCNC: 6.2 G/DL (ref 2–4)
GLUCOSE SERPL-MCNC: 114 MG/DL (ref 74–106)
HCT VFR BLD AUTO: 39.5 % (ref 40–54)
HGB BLD-MCNC: 11.9 G/DL (ref 13.5–18)
HYPOCHROMIA BLD QL SMEAR: ABNORMAL
IMM GRANULOCYTES # BLD AUTO: 0.32 K/UL (ref 0–0.04)
IMM GRANULOCYTES NFR BLD: 1.1 % (ref 0–0.4)
LACTATE SERPL-SCNC: 2.2 MMOL/L (ref 0.4–2)
LACTATE SERPL-SCNC: 2.4 MMOL/L (ref 0.4–2)
LYMPHOCYTES # BLD AUTO: 0.65 K/UL (ref 1–4.8)
LYMPHOCYTES NFR BLD AUTO: 2.2 % (ref 27–41)
LYMPHOCYTES NFR BLD MANUAL: 3 % (ref 27–41)
MACROCYTES BLD QL SMEAR: ABNORMAL
MCH RBC QN AUTO: 29.7 PG (ref 27–31)
MCHC RBC AUTO-ENTMCNC: 30.1 G/DL (ref 32–36)
MCV RBC AUTO: 98.5 FL (ref 80–96)
MONOCYTES # BLD AUTO: 2.16 K/UL (ref 0–0.8)
MONOCYTES NFR BLD AUTO: 7.3 % (ref 2–6)
MONOCYTES NFR BLD MANUAL: 7 % (ref 2–6)
MPC BLD CALC-MCNC: 10.9 FL (ref 9.4–12.4)
NEUTROPHILS # BLD AUTO: 26.38 K/UL (ref 1.8–7.7)
NEUTROPHILS NFR BLD AUTO: 89.2 % (ref 53–65)
NEUTS BAND NFR BLD MANUAL: 3 % (ref 1–5)
NEUTS SEG NFR BLD MANUAL: 87 % (ref 50–62)
NRBC # BLD AUTO: 0 X10E3/UL
NRBC, AUTO (.00): 0 %
OVALOCYTES BLD QL SMEAR: ABNORMAL
PLATELET # BLD AUTO: 415 K/UL (ref 150–400)
PLATELET MORPHOLOGY: ABNORMAL
POTASSIUM SERPL-SCNC: 4.4 MMOL/L (ref 3.5–5.1)
PROT SERPL-MCNC: 8.5 G/DL (ref 6.4–8.2)
RBC # BLD AUTO: 4.01 M/UL (ref 4.6–6.2)
SODIUM SERPL-SCNC: 148 MMOL/L (ref 136–145)
WBC # BLD AUTO: 29.57 K/UL (ref 4.5–11)

## 2023-06-26 PROCEDURE — 93005 ELECTROCARDIOGRAM TRACING: CPT

## 2023-06-26 PROCEDURE — 63600175 PHARM REV CODE 636 W HCPCS: Performed by: FAMILY MEDICINE

## 2023-06-26 PROCEDURE — 96361 HYDRATE IV INFUSION ADD-ON: CPT

## 2023-06-26 PROCEDURE — 99214 OFFICE O/P EST MOD 30 MIN: CPT | Mod: PBBFAC,25 | Performed by: INTERNAL MEDICINE

## 2023-06-26 PROCEDURE — 3078F DIAST BP <80 MM HG: CPT | Mod: CPTII,,, | Performed by: INTERNAL MEDICINE

## 2023-06-26 PROCEDURE — 1101F PR PT FALLS ASSESS DOC 0-1 FALLS W/OUT INJ PAST YR: ICD-10-PCS | Mod: CPTII,,, | Performed by: INTERNAL MEDICINE

## 2023-06-26 PROCEDURE — 3074F PR MOST RECENT SYSTOLIC BLOOD PRESSURE < 130 MM HG: ICD-10-PCS | Mod: CPTII,,, | Performed by: INTERNAL MEDICINE

## 2023-06-26 PROCEDURE — 63600175 PHARM REV CODE 636 W HCPCS

## 2023-06-26 PROCEDURE — 3008F BODY MASS INDEX DOCD: CPT | Mod: CPTII,,, | Performed by: INTERNAL MEDICINE

## 2023-06-26 PROCEDURE — 3008F PR BODY MASS INDEX (BMI) DOCUMENTED: ICD-10-PCS | Mod: CPTII,,, | Performed by: INTERNAL MEDICINE

## 2023-06-26 PROCEDURE — 99284 EMERGENCY DEPT VISIT MOD MDM: CPT | Mod: ,,, | Performed by: FAMILY MEDICINE

## 2023-06-26 PROCEDURE — 25000003 PHARM REV CODE 250: Performed by: FAMILY MEDICINE

## 2023-06-26 PROCEDURE — 96372 THER/PROPH/DIAG INJ SC/IM: CPT | Mod: PBBFAC | Performed by: INTERNAL MEDICINE

## 2023-06-26 PROCEDURE — 87040 BLOOD CULTURE FOR BACTERIA: CPT | Performed by: FAMILY MEDICINE

## 2023-06-26 PROCEDURE — 1125F AMNT PAIN NOTED PAIN PRSNT: CPT | Mod: CPTII,,, | Performed by: INTERNAL MEDICINE

## 2023-06-26 PROCEDURE — 25000003 PHARM REV CODE 250

## 2023-06-26 PROCEDURE — 99284 PR EMERGENCY DEPT VISIT,LEVEL IV: ICD-10-PCS | Mod: ,,, | Performed by: FAMILY MEDICINE

## 2023-06-26 PROCEDURE — 3288F PR FALLS RISK ASSESSMENT DOCUMENTED: ICD-10-PCS | Mod: CPTII,,, | Performed by: INTERNAL MEDICINE

## 2023-06-26 PROCEDURE — 96374 THER/PROPH/DIAG INJ IV PUSH: CPT

## 2023-06-26 PROCEDURE — 3288F FALL RISK ASSESSMENT DOCD: CPT | Mod: CPTII,,, | Performed by: INTERNAL MEDICINE

## 2023-06-26 PROCEDURE — 80053 COMPREHEN METABOLIC PANEL: CPT | Performed by: FAMILY MEDICINE

## 2023-06-26 PROCEDURE — 99285 EMERGENCY DEPT VISIT HI MDM: CPT | Mod: 25

## 2023-06-26 PROCEDURE — 93010 ELECTROCARDIOGRAM REPORT: CPT | Mod: ,,, | Performed by: HOSPITALIST

## 2023-06-26 PROCEDURE — 3078F PR MOST RECENT DIASTOLIC BLOOD PRESSURE < 80 MM HG: ICD-10-PCS | Mod: CPTII,,, | Performed by: INTERNAL MEDICINE

## 2023-06-26 PROCEDURE — 99215 PR OFFICE/OUTPT VISIT, EST, LEVL V, 40-54 MIN: ICD-10-PCS | Mod: S$PBB,25,, | Performed by: INTERNAL MEDICINE

## 2023-06-26 PROCEDURE — 99215 OFFICE O/P EST HI 40 MIN: CPT | Mod: S$PBB,25,, | Performed by: INTERNAL MEDICINE

## 2023-06-26 PROCEDURE — 11000001 HC ACUTE MED/SURG PRIVATE ROOM

## 2023-06-26 PROCEDURE — 93010 EKG 12-LEAD: ICD-10-PCS | Mod: ,,, | Performed by: HOSPITALIST

## 2023-06-26 PROCEDURE — 83605 ASSAY OF LACTIC ACID: CPT | Performed by: FAMILY MEDICINE

## 2023-06-26 PROCEDURE — 1101F PT FALLS ASSESS-DOCD LE1/YR: CPT | Mod: CPTII,,, | Performed by: INTERNAL MEDICINE

## 2023-06-26 PROCEDURE — 1125F PR PAIN SEVERITY QUANTIFIED, PAIN PRESENT: ICD-10-PCS | Mod: CPTII,,, | Performed by: INTERNAL MEDICINE

## 2023-06-26 PROCEDURE — 3074F SYST BP LT 130 MM HG: CPT | Mod: CPTII,,, | Performed by: INTERNAL MEDICINE

## 2023-06-26 PROCEDURE — 1159F PR MEDICATION LIST DOCUMENTED IN MEDICAL RECORD: ICD-10-PCS | Mod: CPTII,,, | Performed by: INTERNAL MEDICINE

## 2023-06-26 PROCEDURE — 1159F MED LIST DOCD IN RCRD: CPT | Mod: CPTII,,, | Performed by: INTERNAL MEDICINE

## 2023-06-26 PROCEDURE — 85025 COMPLETE CBC W/AUTO DIFF WBC: CPT | Performed by: FAMILY MEDICINE

## 2023-06-26 RX ORDER — CHLORHEXIDINE GLUCONATE ORAL RINSE 1.2 MG/ML
SOLUTION DENTAL
COMMUNITY
Start: 2023-05-25 | End: 2023-06-26

## 2023-06-26 RX ORDER — TAMSULOSIN HYDROCHLORIDE 0.4 MG/1
0.4 CAPSULE ORAL DAILY
Status: DISCONTINUED | OUTPATIENT
Start: 2023-06-27 | End: 2023-06-28 | Stop reason: HOSPADM

## 2023-06-26 RX ORDER — CLOPIDOGREL BISULFATE 75 MG/1
75 TABLET ORAL DAILY
Status: DISCONTINUED | OUTPATIENT
Start: 2023-06-27 | End: 2023-06-27

## 2023-06-26 RX ORDER — PREDNISONE 20 MG/1
40 TABLET ORAL DAILY
Qty: 10 TABLET | Refills: 0 | Status: ON HOLD | OUTPATIENT
Start: 2023-06-26 | End: 2023-06-28 | Stop reason: HOSPADM

## 2023-06-26 RX ORDER — FINASTERIDE 5 MG/1
5 TABLET, FILM COATED ORAL DAILY
Status: DISCONTINUED | OUTPATIENT
Start: 2023-06-27 | End: 2023-06-27

## 2023-06-26 RX ORDER — GLUCAGON 1 MG
1 KIT INJECTION
Status: DISCONTINUED | OUTPATIENT
Start: 2023-06-26 | End: 2023-06-28 | Stop reason: HOSPADM

## 2023-06-26 RX ORDER — MUPIROCIN 20 MG/G
OINTMENT TOPICAL 2 TIMES DAILY
Status: DISCONTINUED | OUTPATIENT
Start: 2023-06-26 | End: 2023-06-28 | Stop reason: HOSPADM

## 2023-06-26 RX ORDER — SODIUM CHLORIDE, SODIUM LACTATE, POTASSIUM CHLORIDE, CALCIUM CHLORIDE 600; 310; 30; 20 MG/100ML; MG/100ML; MG/100ML; MG/100ML
INJECTION, SOLUTION INTRAVENOUS CONTINUOUS
Start: 2023-06-26

## 2023-06-26 RX ORDER — ZINC SULFATE 50(220)MG
220 CAPSULE ORAL DAILY
Status: DISCONTINUED | OUTPATIENT
Start: 2023-06-27 | End: 2023-06-27

## 2023-06-26 RX ORDER — TALC
9 POWDER (GRAM) TOPICAL NIGHTLY PRN
Status: DISCONTINUED | OUTPATIENT
Start: 2023-06-26 | End: 2023-06-28 | Stop reason: HOSPADM

## 2023-06-26 RX ORDER — ONDANSETRON HYDROCHLORIDE 8 MG/1
TABLET, FILM COATED ORAL
Qty: 40 TABLET | Refills: 3 | Status: SHIPPED | OUTPATIENT
Start: 2023-06-26

## 2023-06-26 RX ORDER — ALFUZOSIN HYDROCHLORIDE 10 MG/1
10 TABLET, EXTENDED RELEASE ORAL
Qty: 90 TABLET | Refills: 1 | Status: SHIPPED | OUTPATIENT
Start: 2023-06-26

## 2023-06-26 RX ORDER — ONDANSETRON 4 MG/1
8 TABLET, FILM COATED ORAL EVERY 6 HOURS PRN
Status: DISCONTINUED | OUTPATIENT
Start: 2023-06-26 | End: 2023-06-27

## 2023-06-26 RX ORDER — SODIUM CHLORIDE 450 MG/100ML
INJECTION, SOLUTION INTRAVENOUS CONTINUOUS
Status: DISCONTINUED | OUTPATIENT
Start: 2023-06-26 | End: 2023-06-27

## 2023-06-26 RX ORDER — ATORVASTATIN CALCIUM 80 MG/1
80 TABLET, FILM COATED ORAL DAILY
Status: DISCONTINUED | OUTPATIENT
Start: 2023-06-27 | End: 2023-06-27

## 2023-06-26 RX ORDER — NALOXONE HCL 0.4 MG/ML
0.02 VIAL (ML) INJECTION
Status: DISCONTINUED | OUTPATIENT
Start: 2023-06-26 | End: 2023-06-28 | Stop reason: HOSPADM

## 2023-06-26 RX ORDER — HEPARIN SODIUM 5000 [USP'U]/ML
5000 INJECTION, SOLUTION INTRAVENOUS; SUBCUTANEOUS EVERY 8 HOURS
Status: DISCONTINUED | OUTPATIENT
Start: 2023-06-26 | End: 2023-06-28 | Stop reason: HOSPADM

## 2023-06-26 RX ORDER — AZITHROMYCIN 250 MG/1
TABLET, FILM COATED ORAL
Qty: 6 TABLET | Refills: 0 | Status: ON HOLD | OUTPATIENT
Start: 2023-06-26 | End: 2023-06-28 | Stop reason: HOSPADM

## 2023-06-26 RX ORDER — SODIUM CHLORIDE 450 MG/100ML
INJECTION, SOLUTION INTRAVENOUS CONTINUOUS
Status: DISCONTINUED | OUTPATIENT
Start: 2023-06-26 | End: 2023-06-26

## 2023-06-26 RX ORDER — HEPARIN 100 UNIT/ML
500 SYRINGE INTRAVENOUS
OUTPATIENT
Start: 2023-06-26

## 2023-06-26 RX ORDER — LEVOTHYROXINE SODIUM 75 UG/1
150 TABLET ORAL EVERY OTHER DAY
Status: DISCONTINUED | OUTPATIENT
Start: 2023-06-26 | End: 2023-06-27

## 2023-06-26 RX ORDER — ACETAMINOPHEN 325 MG/1
650 TABLET ORAL EVERY 4 HOURS PRN
Status: DISCONTINUED | OUTPATIENT
Start: 2023-06-26 | End: 2023-06-28 | Stop reason: HOSPADM

## 2023-06-26 RX ORDER — SODIUM CHLORIDE 0.9 % (FLUSH) 0.9 %
10 SYRINGE (ML) INJECTION EVERY 12 HOURS PRN
Status: DISCONTINUED | OUTPATIENT
Start: 2023-06-26 | End: 2023-06-28 | Stop reason: HOSPADM

## 2023-06-26 RX ORDER — POLYETHYLENE GLYCOL 3350 17 G/17G
17 POWDER, FOR SOLUTION ORAL DAILY PRN
Status: DISCONTINUED | OUTPATIENT
Start: 2023-06-26 | End: 2023-06-28 | Stop reason: HOSPADM

## 2023-06-26 RX ORDER — METHYLPREDNISOLONE ACETATE 40 MG/ML
40 INJECTION, SUSPENSION INTRA-ARTICULAR; INTRALESIONAL; INTRAMUSCULAR; SOFT TISSUE
Status: DISCONTINUED | OUTPATIENT
Start: 2023-06-26 | End: 2023-06-26 | Stop reason: HOSPADM

## 2023-06-26 RX ORDER — ACETAMINOPHEN 325 MG/1
650 TABLET ORAL EVERY 8 HOURS PRN
Status: DISCONTINUED | OUTPATIENT
Start: 2023-06-26 | End: 2023-06-28 | Stop reason: HOSPADM

## 2023-06-26 RX ORDER — SODIUM CHLORIDE 0.9 % (FLUSH) 0.9 %
10 SYRINGE (ML) INJECTION
OUTPATIENT
Start: 2023-06-26

## 2023-06-26 RX ORDER — ONDANSETRON 2 MG/ML
4 INJECTION INTRAMUSCULAR; INTRAVENOUS ONCE
Status: COMPLETED | OUTPATIENT
Start: 2023-06-26 | End: 2023-06-26

## 2023-06-26 RX ADMIN — SODIUM CHLORIDE 1000 ML: 9 INJECTION, SOLUTION INTRAVENOUS at 12:06

## 2023-06-26 RX ADMIN — PIPERACILLIN AND TAZOBACTAM 4.5 G: 4; .5 INJECTION, POWDER, LYOPHILIZED, FOR SOLUTION INTRAVENOUS; PARENTERAL at 02:06

## 2023-06-26 RX ADMIN — METHYLPREDNISOLONE ACETATE 40 MG: 40 INJECTION, SUSPENSION INTRA-ARTICULAR; INTRALESIONAL; INTRAMUSCULAR; SOFT TISSUE at 10:06

## 2023-06-26 RX ADMIN — MUPIROCIN: 20 OINTMENT TOPICAL at 08:06

## 2023-06-26 RX ADMIN — HEPARIN SODIUM 5000 UNITS: 5000 INJECTION, SOLUTION INTRAVENOUS; SUBCUTANEOUS at 09:06

## 2023-06-26 RX ADMIN — ONDANSETRON 4 MG: 2 INJECTION INTRAMUSCULAR; INTRAVENOUS at 12:06

## 2023-06-26 RX ADMIN — SODIUM CHLORIDE: 4.5 INJECTION, SOLUTION INTRAVENOUS at 05:06

## 2023-06-26 RX ADMIN — LEVOTHYROXINE SODIUM 150 MCG: 150 TABLET ORAL at 06:06

## 2023-06-26 RX ADMIN — SODIUM CHLORIDE: 4.5 INJECTION, SOLUTION INTRAVENOUS at 08:06

## 2023-06-26 RX ADMIN — SODIUM CHLORIDE 1000 ML: 9 INJECTION, SOLUTION INTRAVENOUS at 06:06

## 2023-06-26 NOTE — PROGRESS NOTES
Subjective:       Patient ID: Kerwin Wilson is a 73 y.o. male.    Chief Complaint: Follow-up (Not eating or drinking, pt wife states he is staying in bed. /Bereniceorade and broth are coming back up after she is trying to give it to him. )    10/18/22- The patient is a 71 yo male that presents today to establish care. He recently moved here from [place]. He has history of CVA with expressive aphasia, vascular dementia, dyslipidemia, hypothyroidism, RCC s/p right nephrectomy, and BPH. CVA was about 1 year ago. Not long after that a renal mass was found and the patient had right nephrectomy and some of the bladder removed. He could not tolerate chemo. Las saw urology about 3-4 months ago. He is up to date on age appropriate health screenings. He complains of some left leg pain right at the gluteal cleft. The part that he says is tender is right at tendon insertion of one of the hamstring muscles. He does walk frequently but denies any injury. Today he is resting comfortably in no distress. He is afebrile and vital signs are stable.    2/23/23- Patient presents today for follow up. He has history of CVA with expressive aphasia, vascular dementia, dyslipidemia, hypothyroidism, RCC s/p right nephrectomy, and BPH. He is also currently undergoing radiation and chemotherapy for metastatic urothelial cancer. He does report some generalized weakness and fatigue since beginning this treatment. He complains of some intermittent constipation related to previously prescribed short-term Hydrocodone use that was managed well with daily Miralax. He has discontinued use of the Hydrocodone but does report some continued constipation over the past 2 days and states he resumed the Miralax this morning. He is resting comfortably during the exam and appears in no distress.  Appetite has decreased some and weight is down about 15 lb since I last saw him. He has now completed his radiation treatments.    6/26/23-the patient presents today  for follow-up.  He currently is not doing very well.  He started a new immunotherapy a week ago.  Since that time he is not been able to keep anything down and also does not have any appetite.  He is very weak.  Heart rate is 119 in his blood pressure is 104/58.  Continues to lose weight.  According to his sister that is with him he really has not eaten or drank anything of substance over the last week.    Follow-up  Associated symptoms include fatigue and weakness. Pertinent negatives include no abdominal pain, arthralgias, chest pain, chills, congestion, coughing, fever, headaches, joint swelling, myalgias, nausea, neck pain, rash or sore throat.   Review of Systems   Constitutional:  Positive for fatigue. Negative for appetite change, chills and fever.   HENT:  Negative for nasal congestion, ear pain, hearing loss, sinus pressure/congestion and sore throat.    Eyes:  Negative for pain, redness and visual disturbance.   Respiratory:  Negative for apnea, cough, shortness of breath and wheezing.    Cardiovascular:  Negative for chest pain and palpitations.   Gastrointestinal:  Negative for abdominal pain, constipation, diarrhea and nausea.   Endocrine: Negative for cold intolerance, heat intolerance and polyuria.   Genitourinary:  Negative for dysuria and hematuria.   Musculoskeletal:  Positive for leg pain. Negative for arthralgias, back pain, joint swelling, myalgias and neck pain.   Integumentary:  Negative for pallor, rash and wound.   Neurological:  Positive for weakness and memory loss. Negative for tremors, seizures and headaches.   Hematological:  Negative for adenopathy.   Psychiatric/Behavioral:  Negative for confusion, dysphoric mood and sleep disturbance. The patient is not nervous/anxious.        Objective:      Physical Exam  Vitals and nursing note reviewed.   Constitutional:       General: He is not in acute distress.     Appearance: He is ill-appearing.      Comments: Thin chronically ill appearing  male   HENT:      Head: Normocephalic and atraumatic.      Right Ear: External ear normal.      Left Ear: External ear normal.      Nose: Nose normal.      Mouth/Throat:      Pharynx: Oropharynx is clear.   Eyes:      Extraocular Movements: Extraocular movements intact.      Conjunctiva/sclera: Conjunctivae normal.      Pupils: Pupils are equal, round, and reactive to light.   Neck:      Vascular: No carotid bruit.   Cardiovascular:      Rate and Rhythm: Regular rhythm. Tachycardia present.      Pulses: Normal pulses.      Heart sounds: Normal heart sounds. No murmur heard.  Pulmonary:      Effort: No respiratory distress.      Breath sounds: Normal breath sounds. No wheezing or rales.   Abdominal:      General: Bowel sounds are normal.      Palpations: Abdomen is soft.   Musculoskeletal:         General: Normal range of motion.      Cervical back: Normal range of motion and neck supple.      Right lower leg: No edema.      Left lower leg: No edema.   Skin:     General: Skin is warm and dry.      Capillary Refill: Capillary refill takes less than 2 seconds.      Coloration: Skin is not pale.   Neurological:      General: No focal deficit present.      Mental Status: He is alert and oriented to person, place, and time.      Cranial Nerves: No cranial nerve deficit.      Sensory: No sensory deficit.      Gait: Gait normal.      Comments: Some expressive aphasia   Psychiatric:         Mood and Affect: Mood normal.         Judgment: Judgment normal.       Assessment:       Problem List Items Addressed This Visit          Neuro    History of stroke    Expressive aphasia       Cardiac/Vascular    Dyslipidemia       Renal/    Dehydration    Relevant Orders    Comprehensive Metabolic Panel    Magnesium       Oncology    History of renal cell carcinoma       Endocrine    Hypothyroidism     Other Visit Diagnoses       Follow-up exam    -  Primary              Plan:       1.The patient presents today for follow-up.  He  appears volume contracted on exam.  Very little p.o. intake over the last week.  Heart rate is 119 and blood pressure is 104/58.  Try to set him up with IV fluids in the outpatient infusion center but they are not able to accommodate us today.  I feel like he really needs some fluids.  I have spoken with the ER and we are going to send him down there to get some fluids    2. Hx of CVA- with residual expressive aphasia. He is on plavix, statin. No focal deficit and he is exercising frequently. He also has some dementia that is thought to be vascular.     3. Dyslipidemia- the patient is on high intensity statin.     4. Hypothyroidism- He is on alternating doses of synthroid 137 mcg and 150 mcg every other day. Continue with current care. Appears euthyroid clinically.    5. History of renal cell carcinoma- s/p nephrectomy on the right and some of his bladder also resected. He will need to establish with urology here so we will make that referral. He is on uroxatral 10 mg daily and proscar 5 mg daily  2/23- Workup revealed metastatic disease. He underwent palliative radiation to left hip and has received 2 rounds of Chemo with Dr. Wiggins. We have discussed supplementing nutrition with boost or ensure  6/26/23-he started immunotherapy a week ago.  A lot of his current symptoms started after the immunotherapy.  I will reach out with Dr. Wiggins.    6. Moderate protein/nutrition deficiency- he continues to lose weight.  No appetite.  I have refilled his Zofran.  We are going to see if we can get him some fluids today.  Recommend supplementing meals with booster Ensure.  I will also discuss with his oncologist to see if there is anything else we can do.    RTC in 6 months

## 2023-06-26 NOTE — SUBJECTIVE & OBJECTIVE
Past Medical History:   Diagnosis Date    Disorder of thyroid, unspecified     History of renal cell carcinoma 01/2022    With mets per bone scan and MRI imaging Followed by Dr. Wiggins    Left leg pain 12/29/2022    5 months     Mixed hyperlipidemia     Stroke 11/2021    vascular dementia        Past Surgical History:   Procedure Laterality Date    BLADDER SURGERY  2022    portion of bladder removed    KIDNEY SURGERY Right 2022    kidney removal    TONSILLECTOMY      TRANSURETHRAL RESECTION OF PROSTATE  02/2023       Review of patient's allergies indicates:   Allergen Reactions    Asa [aspirin] Swelling       Current Facility-Administered Medications on File Prior to Encounter   Medication    [COMPLETED] methylPREDNISolone acetate injection 40 mg     Current Outpatient Medications on File Prior to Encounter   Medication Sig    acetaminophen (TYLENOL) 325 MG tablet Take 325 mg by mouth every 6 (six) hours as needed for Pain.    alfuzosin (UROXATRAL) 10 mg Tb24 Take 1 tablet (10 mg total) by mouth daily with breakfast.    atorvastatin (LIPITOR) 80 MG tablet Take 80 mg by mouth once daily.    calcium-mag oxide-vitamin D3 185- mg-mg-unit Cap Take 1 tablet by mouth once daily.    clopidogreL (PLAVIX) 75 mg tablet Take 1 tablet (75 mg total) by mouth once daily.    finasteride (PROSCAR) 5 mg tablet Take 1 tablet (5 mg total) by mouth once daily.    levothyroxine (SYNTHROID) 137 MCG Tab tablet Take 1 tablet (137 mcg total) by mouth every other day.    levothyroxine (SYNTHROID) 150 MCG tablet Take 1 tablet (150 mcg total) by mouth every other day.    potassium Cl-calcium phos-mag 40-18-9 mg Tab Take 1 tablet by mouth once daily.    zinc gluconate 50 mg tablet Take 50 mg by mouth once daily.    [DISCONTINUED] chlorhexidine (PERIDEX) 0.12 % solution PLACE ON EVERY-TIP AND DAB ON EXTRACTION SITE THREE TIMES DAILY. NO FOOD/DRINK FOR 30 MINUTES. START ON 3RD DAYS AFTER EXTRACTION    azithromycin (Z-MILVIA) 250 MG tablet Take  2 tablets by mouth on day 1; Take 1 tablet by mouth on days 2-5    cephALEXin (KEFLEX) 500 MG capsule Take 500 mg by mouth every 12 (twelve) hours.    ondansetron (ZOFRAN) 8 MG tablet TAKE 1 TABLET BY MOUTH EVERY 6 HOURS AS NEEDED FOR NAUSEA OR VOMITING    predniSONE (DELTASONE) 20 MG tablet Take 2 tablets (40 mg total) by mouth once daily.    [DISCONTINUED] alfuzosin (UROXATRAL) 10 mg Tb24 Take 10 mg by mouth daily with breakfast.    [DISCONTINUED] HYDROcodone-acetaminophen (NORCO) 5-325 mg per tablet Take 1 tablet by mouth every 4 (four) hours as needed.    [DISCONTINUED] ondansetron (ZOFRAN) 8 MG tablet TAKE 1 TABLET BY MOUTH EVERY 6 HOURS AS NEEDED FOR NAUSEA OR VOMITING     Family History       Problem Relation (Age of Onset)    Colon cancer Paternal Grandfather    Dementia Mother    Diabetes Paternal Grandfather    Heart attack Father (52)    Hypothyroidism Mother          Tobacco Use    Smoking status: Former     Packs/day: 0.25     Years: 4.00     Pack years: 1.00     Types: Cigarettes    Smokeless tobacco: Never   Substance and Sexual Activity    Alcohol use: Not Currently    Drug use: Never    Sexual activity: Not Currently     Review of Systems   Constitutional:  Positive for appetite change and unexpected weight change (weight loss). Negative for chills, diaphoresis and fever.   HENT:  Positive for congestion, rhinorrhea and sore throat.    Eyes:  Negative for visual disturbance.   Respiratory:  Negative for shortness of breath.    Cardiovascular:  Negative for chest pain.   Gastrointestinal:  Positive for nausea and vomiting. Negative for abdominal pain.   Genitourinary:  Negative for dysuria and hematuria.   Musculoskeletal:  Negative for back pain.   Skin:  Negative for wound.   Neurological:  Positive for weakness.   Psychiatric/Behavioral:  Negative for sleep disturbance.    Objective:     Vital Signs (Most Recent):  Temp: 97.5 °F (36.4 °C) (06/26/23 1042)  Pulse: 110 (06/26/23 1042)  Resp: 20  (06/26/23 1042)  BP: 128/75 (06/26/23 1042)  SpO2: 95 % (06/26/23 1042) Vital Signs (24h Range):  Temp:  [96.8 °F (36 °C)-97.5 °F (36.4 °C)] 97.5 °F (36.4 °C)  Pulse:  [110-119] 110  Resp:  [18-20] 20  SpO2:  [95 %-96 %] 95 %  BP: (104-128)/(58-75) 128/75     Weight: 68 kg (150 lb)  Body mass index is 20.34 kg/m².     Physical Exam  Vitals and nursing note reviewed.   Constitutional:       General: He is not in acute distress.     Appearance: He is not toxic-appearing or diaphoretic.   HENT:      Head: Normocephalic and atraumatic.      Right Ear: External ear normal.      Left Ear: External ear normal.      Nose: Nose normal.      Mouth/Throat:      Mouth: Mucous membranes are dry.      Pharynx: Oropharynx is clear. No oropharyngeal exudate.   Eyes:      General: No scleral icterus.     Extraocular Movements: Extraocular movements intact.      Pupils: Pupils are equal, round, and reactive to light.   Cardiovascular:      Rate and Rhythm: Normal rate and regular rhythm.      Pulses: Normal pulses.      Heart sounds: Normal heart sounds. No murmur heard.  Pulmonary:      Effort: Pulmonary effort is normal. No respiratory distress.      Breath sounds: Normal breath sounds. No wheezing.   Abdominal:      General: Abdomen is flat. Bowel sounds are normal. There is no distension.      Palpations: Abdomen is soft.      Tenderness: There is no abdominal tenderness.   Musculoskeletal:         General: No deformity.      Cervical back: Neck supple.   Skin:     General: Skin is warm and dry.      Coloration: Skin is not jaundiced.   Neurological:      Mental Status: He is alert and oriented to person, place, and time.   Psychiatric:         Mood and Affect: Mood normal.         Behavior: Behavior normal.            CRANIAL NERVES     CN III, IV, VI   Pupils are equal, round, and reactive to light.     Significant Labs: All pertinent labs within the past 24 hours have been reviewed.    Significant Imaging: I have reviewed all  pertinent imaging results/findings within the past 24 hours.

## 2023-06-26 NOTE — ASSESSMENT & PLAN NOTE
Nutrition consulted. Most recent weight and BMI monitored-     Measurements:  Wt Readings from Last 1 Encounters:   06/26/23 68 kg (150 lb)   Body mass index is 20.34 kg/m².    Patient has been screened and assessed by RD - pending    Malnutrition Type:  Context:    Level:      Malnutrition Characteristic Summary:         Interventions/Recommendations (treatment strategy):          Diet consult pending  Ensure supplement TID

## 2023-06-26 NOTE — H&P
[facility] - Emergency Department  Hospital Medicine  History & Physical    Patient Name: Kerwin Wilson  MRN: [id number]  Patient Class: IP- Inpatient  Admission Date: 6/26/2023  Attending Physician: Leidy Pate DO   Primary Care Provider: Chito Leblanc DO         Patient information was obtained from patient, relative(s), caregiver / friend, past medical records and ER records.     Subjective:     Principal Problem:ALEXANDER (acute kidney injury)    Chief Complaint:   Chief Complaint   Patient presents with    Vomiting    Weakness    Sore Throat    Dehydration        HPI: Patient is a 74yo male with a PMH of RCC diagnosed in 2022 with mets per bone scan and MRI, s/p nephrectomy on the right and partial bladder resection, palliative radiation of left hip, 2 rounds of chemo, thyroid disorder on synthroid, CVA with residual expressive aphasia on Plavix and statin, HLD, vascular dementia, BPH s/p TURP who presents to [facility] ED with decreased appetite, PO intake, vomiting, weakness, dehydration, and sore throat. Patient was sent from Dr. Leblanc's office for dehydration and IV fluids. He was tachycardic and hypotension at Dr. Leblanc's office. All symptoms started after initiating immunotherapy infusion by Oncology Dr. Wiggins for his RCC 7 days ago. Patient sees Dr. Wiggins and had palliative radiation and chemo done.    Patient denies f/c/n/v/d/cp/sob. Symptoms started 7 days ago that gradually worsened. Initially patient and sister in room thought it was flu but symptoms kept worsening including general weakness and decreased PO intake. Endorses nasal congestion and discharge that improved on Claritin D. Endorses inability to keep food down even yogurt and gatorade. Reports weight loss of about 20 lbs since October 2022. Of note, patient moved here from [place] last year.    In the ED, VSS except P 110, R 20, /75. Labs remarkable for WBC 29.57, H/H 11.9/39.5, . Na 148, Cl 109, BUN/Cr 128/3.98.  Lactic acid 2.2. Patient received NS 1L bolus, Zofran 4mg IV, Zosyn 4.5g. He is admitted for hospital medicine for further management and care.       Past Medical History:   Diagnosis Date    Disorder of thyroid, unspecified     History of renal cell carcinoma 01/2022    With mets per bone scan and MRI imaging Followed by Dr. Wiggins    Left leg pain 12/29/2022    5 months     Mixed hyperlipidemia     Stroke 11/2021    vascular dementia        Past Surgical History:   Procedure Laterality Date    BLADDER SURGERY  2022    portion of bladder removed    KIDNEY SURGERY Right 2022    kidney removal    TONSILLECTOMY      TRANSURETHRAL RESECTION OF PROSTATE  02/2023       Review of patient's allergies indicates:   Allergen Reactions    Asa [aspirin] Swelling       Current Facility-Administered Medications on File Prior to Encounter   Medication    [COMPLETED] methylPREDNISolone acetate injection 40 mg     Current Outpatient Medications on File Prior to Encounter   Medication Sig    acetaminophen (TYLENOL) 325 MG tablet Take 325 mg by mouth every 6 (six) hours as needed for Pain.    alfuzosin (UROXATRAL) 10 mg Tb24 Take 1 tablet (10 mg total) by mouth daily with breakfast.    atorvastatin (LIPITOR) 80 MG tablet Take 80 mg by mouth once daily.    calcium-mag oxide-vitamin D3 185- mg-mg-unit Cap Take 1 tablet by mouth once daily.    clopidogreL (PLAVIX) 75 mg tablet Take 1 tablet (75 mg total) by mouth once daily.    finasteride (PROSCAR) 5 mg tablet Take 1 tablet (5 mg total) by mouth once daily.    levothyroxine (SYNTHROID) 137 MCG Tab tablet Take 1 tablet (137 mcg total) by mouth every other day.    levothyroxine (SYNTHROID) 150 MCG tablet Take 1 tablet (150 mcg total) by mouth every other day.    potassium Cl-calcium phos-mag 40-18-9 mg Tab Take 1 tablet by mouth once daily.    zinc gluconate 50 mg tablet Take 50 mg by mouth once daily.    [DISCONTINUED] chlorhexidine (PERIDEX) 0.12 % solution PLACE ON EVERY-TIP AND  DAB ON EXTRACTION SITE THREE TIMES DAILY. NO FOOD/DRINK FOR 30 MINUTES. START ON 3RD DAYS AFTER EXTRACTION    azithromycin (Z-MILVIA) 250 MG tablet Take 2 tablets by mouth on day 1; Take 1 tablet by mouth on days 2-5    cephALEXin (KEFLEX) 500 MG capsule Take 500 mg by mouth every 12 (twelve) hours.    ondansetron (ZOFRAN) 8 MG tablet TAKE 1 TABLET BY MOUTH EVERY 6 HOURS AS NEEDED FOR NAUSEA OR VOMITING    predniSONE (DELTASONE) 20 MG tablet Take 2 tablets (40 mg total) by mouth once daily.    [DISCONTINUED] alfuzosin (UROXATRAL) 10 mg Tb24 Take 10 mg by mouth daily with breakfast.    [DISCONTINUED] HYDROcodone-acetaminophen (NORCO) 5-325 mg per tablet Take 1 tablet by mouth every 4 (four) hours as needed.    [DISCONTINUED] ondansetron (ZOFRAN) 8 MG tablet TAKE 1 TABLET BY MOUTH EVERY 6 HOURS AS NEEDED FOR NAUSEA OR VOMITING     Family History       Problem Relation (Age of Onset)    Colon cancer Paternal Grandfather    Dementia Mother    Diabetes Paternal Grandfather    Heart attack Father (52)    Hypothyroidism Mother          Tobacco Use    Smoking status: Former     Packs/day: 0.25     Years: 4.00     Pack years: 1.00     Types: Cigarettes    Smokeless tobacco: Never   Substance and Sexual Activity    Alcohol use: Not Currently    Drug use: Never    Sexual activity: Not Currently     Review of Systems   Constitutional:  Positive for appetite change and unexpected weight change (weight loss). Negative for chills, diaphoresis and fever.   HENT:  Positive for congestion, rhinorrhea and sore throat.    Eyes:  Negative for visual disturbance.   Respiratory:  Negative for shortness of breath.    Cardiovascular:  Negative for chest pain.   Gastrointestinal:  Positive for nausea and vomiting. Negative for abdominal pain.   Genitourinary:  Negative for dysuria and hematuria.   Musculoskeletal:  Negative for back pain.   Skin:  Negative for wound.   Neurological:  Positive for weakness.   Psychiatric/Behavioral:  Negative  for sleep disturbance.    Objective:     Vital Signs (Most Recent):  Temp: 97.5 °F (36.4 °C) (06/26/23 1042)  Pulse: 110 (06/26/23 1042)  Resp: 20 (06/26/23 1042)  BP: 128/75 (06/26/23 1042)  SpO2: 95 % (06/26/23 1042) Vital Signs (24h Range):  Temp:  [96.8 °F (36 °C)-97.5 °F (36.4 °C)] 97.5 °F (36.4 °C)  Pulse:  [110-119] 110  Resp:  [18-20] 20  SpO2:  [95 %-96 %] 95 %  BP: (104-128)/(58-75) 128/75     Weight: 68 kg (150 lb)  Body mass index is 20.34 kg/m².     Physical Exam  Vitals and nursing note reviewed.   Constitutional:       General: He is not in acute distress.     Appearance: He is not toxic-appearing or diaphoretic.   HENT:      Head: Normocephalic and atraumatic.      Right Ear: External ear normal.      Left Ear: External ear normal.      Nose: Nose normal.      Mouth/Throat:      Mouth: Mucous membranes are dry.      Pharynx: Oropharynx is clear. No oropharyngeal exudate.   Eyes:      General: No scleral icterus.     Extraocular Movements: Extraocular movements intact.      Pupils: Pupils are equal, round, and reactive to light.   Cardiovascular:      Rate and Rhythm: Normal rate and regular rhythm.      Pulses: Normal pulses.      Heart sounds: Normal heart sounds. No murmur heard.  Pulmonary:      Effort: Pulmonary effort is normal. No respiratory distress.      Breath sounds: Normal breath sounds. No wheezing.   Abdominal:      General: Abdomen is flat. Bowel sounds are normal. There is no distension.      Palpations: Abdomen is soft.      Tenderness: There is no abdominal tenderness.   Musculoskeletal:         General: No deformity.      Cervical back: Neck supple.   Skin:     General: Skin is warm and dry.      Coloration: Skin is not jaundiced.   Neurological:      Mental Status: He is alert and oriented to person, place, and time.   Psychiatric:         Mood and Affect: Mood normal.         Behavior: Behavior normal.            CRANIAL NERVES     CN III, IV, VI   Pupils are equal, round, and  reactive to light.     Significant Labs: All pertinent labs within the past 24 hours have been reviewed.    Significant Imaging: I have reviewed all pertinent imaging results/findings within the past 24 hours.    Assessment/Plan:     * ALEXANDER (acute kidney injury)  Patient with acute kidney injury/acute renal failure likely due to pre-renal azotemia due to dehydration ALEXANDER is currently worsening. Baseline creatinine 1.48 on 12/29/22. - Labs reviewed- Renal function/electrolytes with Estimated Creatinine Clearance: 15.7 mL/min (A) (based on SCr of 3.98 mg/dL (H)). according to latest data. Monitor urine output and serial BMP and adjust therapy as needed. Avoid nephrotoxins and renally dose meds for GFR listed above.    Baseline Cr 1.48 and GFR 50 on 12/29/22  BUN/Cr = 32 2/2 dehydration and decreased PO intake s/p immunotherapy 1 week ago for RCC  Telemetry  IV half NS@100cc/hr for 2 days (maintenace rate)  Avoid nephrotoxic drugs  Renal dose medications  Trend BMP  Consider UA, urine electrolytes, urine protein/Cr if no improvement    Leukocytosis  Likely 2/2 to RCC with mets  Blood cultures x2 pending, Zosyn 4.5g once in ED.  NS 1L bolus to make up 30cc/kg for 68kg = 2040cc  CXR, UA pending  Half NS@100cc/hr after bolus          Acute-on-chronic kidney injury  See ALEXANDER    Dehydration  From decreased PO intake and difficulty swallowing   See ALEXANDER  Swallow eval pending  Clear liquid diet for now      History of stroke  Home Plavix 75 and lipitor 80 qd      Hypothyroidism  Home synthroid 137 and 150 qod      Dyslipidemia  Home lipitor 80 qd      Moderate protein-calorie malnutrition  Nutrition consulted. Most recent weight and BMI monitored-     Measurements:  Wt Readings from Last 1 Encounters:   06/26/23 68 kg (150 lb)   Body mass index is 20.34 kg/m².    Patient has been screened and assessed by RD - pending    Malnutrition Type:  Context:    Level:      Malnutrition Characteristic Summary:          Interventions/Recommendations (treatment strategy):          Diet consult pending  Ensure supplement TID    BPH (benign prostatic hyperplasia)  S/p TURP  Home finasteride 5 qd, Tamsulosin 0.4 qd      History of renal cell carcinoma  Sees Dr. Wiggins Oncology OP        VTE Risk Mitigation (From admission, onward)           Ordered     heparin (porcine) injection 5,000 Units  Every 8 hours         06/26/23 1617     IP VTE HIGH RISK PATIENT  Once         06/26/23 1617     Place sequential compression device  Until discontinued         06/26/23 1617                               Atif Valdez DO  Department of Hospital Medicine  [facility] - Emergency Department

## 2023-06-26 NOTE — ED NOTES
Pharm called to ask what synthoid to sent down. Nurse asked pt which one he tooked yesterday and pt's sister says he took the small one which is 137. Matthew in Pharm notified.

## 2023-06-26 NOTE — ED PROVIDER NOTES
Encounter Date: 6/26/2023    SCRIBE #1 NOTE: I, Lucrecia Lugo, am scribing for, and in the presence of,  Sophia Zarate DO.     History     Chief Complaint   Patient presents with    Vomiting    Weakness    Sore Throat    Dehydration     This is a 72 y/o white male,who presents to the ED with complaints of decreased appetite. His wife states the pt was at Dr. Leblanc's office and was sent down here for IV fluids. His wife states the pt has a known hx of renal cell carcinoma. She notes the pt will try to eat broth but each time he will spit it out. There is no Hx of fever, diarrhea or vomiting. His wife states the pt has had radiation and chemo [place] recently. There are no other complaints/pain in the ED at this time. He has a known Hx of thyroid disorder, stroke and dementia. He has had a right kidney surgery. There is no Smoking Hx. There is a family hx of diabetes, colon cancer, and heart attack.     The history is provided by the spouse. No  was used.   Review of patient's allergies indicates:   Allergen Reactions    Asa [aspirin] Swelling     Past Medical History:   Diagnosis Date    Disorder of thyroid, unspecified     History of renal cell carcinoma 01/2022    With mets per bone scan and MRI imaging Followed by Dr. Wiggins    Left leg pain 12/29/2022    5 months     Mixed hyperlipidemia     Stroke 11/2021    vascular dementia      Past Surgical History:   Procedure Laterality Date    BLADDER SURGERY  2022    portion of bladder removed    KIDNEY SURGERY Right 2022    kidney removal    TONSILLECTOMY      TRANSURETHRAL RESECTION OF PROSTATE  02/2023     Family History   Problem Relation Age of Onset    Hypothyroidism Mother     Dementia Mother     Heart attack Father 52    Diabetes Paternal Grandfather     Colon cancer Paternal Grandfather      Social History     Tobacco Use    Smoking status: Former     Packs/day: 0.25     Years: 4.00     Pack years: 1.00     Types: Cigarettes     Smokeless tobacco: Never   Substance Use Topics    Alcohol use: Not Currently    Drug use: Never     Review of Systems   Constitutional:  Positive for appetite change (Decreased PO Intake.). Negative for fever.   Gastrointestinal:  Negative for diarrhea and vomiting.   All other systems reviewed and are negative.    Physical Exam     Initial Vitals [06/26/23 1042]   BP Pulse Resp Temp SpO2   128/75 110 20 97.5 °F (36.4 °C) 95 %      MAP       --         Physical Exam    Nursing note and vitals reviewed.  Constitutional:   Thin appearing.      HENT:   Head: Normocephalic and atraumatic.   Eyes: Conjunctivae and EOM are normal. Pupils are equal, round, and reactive to light.   Neck: Neck supple.   Normal range of motion.  Pulmonary/Chest: No respiratory distress.   Abdominal: There is no abdominal tenderness.   Musculoskeletal:         General: Normal range of motion.      Cervical back: Normal range of motion and neck supple.     Neurological: He is alert.   Skin: Skin is warm.   Psychiatric: He has a normal mood and affect.       ED Course   Procedures  Labs Reviewed   COMPREHENSIVE METABOLIC PANEL - Abnormal; Notable for the following components:       Result Value    Sodium 148 (*)     Chloride 109 (*)     Anion Gap 21 (*)     Glucose 114 (*)      (*)     Creatinine 3.98 (*)     BUN/Creatinine Ratio 32 (*)     Total Protein 8.5 (*)     Albumin 2.3 (*)     Globulin 6.2 (*)     Alk Phos 499 (*)      (*)      (*)     eGFR 15 (*)     All other components within normal limits   CBC WITH DIFFERENTIAL - Abnormal; Notable for the following components:    WBC 29.57 (*)     RBC 4.01 (*)     Hemoglobin 11.9 (*)     Hematocrit 39.5 (*)     MCV 98.5 (*)     MCHC 30.1 (*)     RDW 15.2 (*)     Platelet Count 415 (*)     Neutrophils % 89.2 (*)     Lymphocytes % 2.2 (*)     Monocytes % 7.3 (*)     Eosinophils % 0.0 (*)     Immature Granulocytes % 1.1 (*)     Neutrophils, Abs 26.38 (*)     Lymphocytes, Absolute  0.65 (*)     Monocytes, Absolute 2.16 (*)     Immature Granulocytes, Absolute 0.32 (*)     All other components within normal limits   MANUAL DIFFERENTIAL - Abnormal; Notable for the following components:    Segmented Neutrophils, Man % 87 (*)     Lymphocytes, Man % 3 (*)     Monocytes, Man % 7 (*)     Platelet Morphology Increased (*)     All other components within normal limits   LACTIC ACID, PLASMA - Abnormal; Notable for the following components:    Lactic Acid 2.2 (*)     All other components within normal limits   LACTIC ACID, PLASMA - Abnormal; Notable for the following components:    Lactic Acid 2.4 (*)     All other components within normal limits   CBC W/ AUTO DIFFERENTIAL    Narrative:     The following orders were created for panel order CBC Auto Differential.  Procedure                               Abnormality         Status                     ---------                               -----------         ------                     CBC with Differential[[id number]]        Abnormal            Final result               Manual Differential[[id number]]          Abnormal            Final result                 Please view results for these tests on the individual orders.   EXTRA TUBES    Narrative:     The following orders were created for panel order EXTRA TUBES.  Procedure                               Abnormality         Status                     ---------                               -----------         ------                     Light Blue Top Hold[[id number]]                              In process                 Red Top Hold[[id number]]                                     In process                   Please view results for these tests on the individual orders.   LIGHT BLUE TOP HOLD   RED TOP HOLD   EXTRA TUBES    Narrative:     The following orders were created for panel order EXTRA TUBES.  Procedure                               Abnormality         Status                     ---------                                -----------         ------                     Light Green Top Hold[[id number]]                             In process                   Please view results for these tests on the individual orders.   LIGHT GREEN TOP HOLD        ECG Results    None       Imaging Results              X-Ray Chest AP Portable (Final result)  Result time 06/26/23 19:30:53      Final result by Jamie Ford MD (06/26/23 19:30:53)                   Impression:      No acute findings.      Electronically signed by: Jamie Ford  Date:    06/26/2023  Time:    19:30               Narrative:    EXAMINATION:  XR CHEST AP PORTABLE    CLINICAL HISTORY:  aspiration risk. WBC 29.57;    TECHNIQUE:  Single frontal view of the chest was performed.    COMPARISON:  None    FINDINGS:  There is a right chest wall venous access port with tip overlying the SVC.  Heart size normal.  Lungs are clear.  No pneumothorax or pleural effusion.                                       Medications   sodium chloride 0.9% bolus 1,000 mL 1,000 mL (0 mLs Intravenous Stopped 6/26/23 1302)   ondansetron injection 4 mg (4 mg Intravenous Given 6/26/23 1205)   piperacillin-tazobactam (ZOSYN) 4.5 g in dextrose 5 % in water (D5W) 5 % 100 mL IVPB (MB+) (0 g Intravenous Stopped 6/26/23 1525)   sodium chloride 0.9% bolus 1,000 mL 1,000 mL (0 mLs Intravenous Stopped 6/26/23 2030)     Medical Decision Making:   Initial Assessment:   Patient comes in with persistent nausea --not eating at all.  Patient undergoing chemotherapy for renal cell carcinoma,,,.,.,  Differential Diagnosis:   1. Dehydration 2. Tachycardia 3.  Leukocytosis.  ED Management:  Admit to hospitalist          Attending Attestation:           Physician Attestation for Scribe:  Physician Attestation Statement for Scribe #1: I, Marcelino Zarate, DO, reviewed documentation, as scribed by Lucrecia Lugo in my presence, and it is both accurate and complete.                        Clinical Impression:   Final  diagnoses:  [E86.0] Dehydration        ED Disposition Condition    Admit                 Marcelino Zarate,   07/11/23 1536

## 2023-06-26 NOTE — HPI
Patient is a 72yo male with a PMH of RCC diagnosed in 2022 with mets per bone scan and MRI, s/p nephrectomy on the right and partial bladder resection, palliative radiation of left hip, 2 rounds of chemo, thyroid disorder on synthroid, CVA with residual expressive aphasia on Plavix and statin, HLD, vascular dementia, BPH s/p TURP who presents to [facility] ED with decreased appetite, PO intake, vomiting, weakness, dehydration, and sore throat. Patient was sent from Dr. Leblanc's office for dehydration and IV fluids. He was tachycardic and hypotension at Dr. Leblanc's office. All symptoms started after initiating immunotherapy infusion by Oncology Dr. Wiggins for his RCC 7 days ago. Patient sees Dr. Wiggins and had palliative radiation and chemo done.    Patient denies f/c/n/v/d/cp/sob. Symptoms started 7 days ago that gradually worsened. Initially patient and sister in room thought it was flu but symptoms kept worsening including general weakness and decreased PO intake. Endorses nasal congestion and discharge that improved on Claritin D. Endorses inability to keep food down even yogurt and gatorade. Reports weight loss of about 20 lbs since October 2022. Of note, patient moved here from [place] last year.    In the ED, VSS except P 110, R 20, /75. Labs remarkable for WBC 29.57, H/H 11.9/39.5, . Na 148, Cl 109, BUN/Cr 128/3.98. Lactic acid 2.2. Patient received NS 1L bolus, Zofran 4mg IV, Zosyn 4.5g. He is admitted for hospital medicine for further management and care.

## 2023-06-26 NOTE — ED TRIAGE NOTES
Presents to ED from 's clinic for c/o vomiting, weakness and sore throat that has been ongoing since starting immunotherapy for renal cell carcinoma last week.

## 2023-06-26 NOTE — ASSESSMENT & PLAN NOTE
Patient with acute kidney injury/acute renal failure likely due to pre-renal azotemia due to dehydration ALEXANDER is currently worsening. Baseline creatinine 1.48 on 12/29/22. - Labs reviewed- Renal function/electrolytes with Estimated Creatinine Clearance: 15.7 mL/min (A) (based on SCr of 3.98 mg/dL (H)). according to latest data. Monitor urine output and serial BMP and adjust therapy as needed. Avoid nephrotoxins and renally dose meds for GFR listed above.    Baseline Cr 1.48 and GFR 50 on 12/29/22  BUN/Cr = 32 2/2 dehydration and decreased PO intake s/p immunotherapy 1 week ago for RCC  Telemetry  IV half NS@100cc/hr for 2 days (maintenace rate)  Avoid nephrotoxic drugs  Renal dose medications  Trend BMP  Consider UA, urine electrolytes, urine protein/Cr if no improvement

## 2023-06-26 NOTE — ASSESSMENT & PLAN NOTE
From decreased PO intake and difficulty swallowing   See ALEXANDER  Swallow eval pending  Clear liquid diet for now

## 2023-06-26 NOTE — ASSESSMENT & PLAN NOTE
Likely 2/2 to RCC with mets  Blood cultures x2 pending, Zosyn 4.5g once in ED.  NS 1L bolus to make up 30cc/kg for 68kg = 2040cc  CXR, UA pending  Half NS@100cc/hr after bolus    6/27: WBC 28.51 mildly improving. HR improving. Repeat lactic acid, UA, and bladder scan pending. CXR negative for acute findings.

## 2023-06-27 PROBLEM — R13.10 DIFFICULTY SWALLOWING: Status: ACTIVE | Noted: 2023-06-27

## 2023-06-27 LAB
ANION GAP SERPL CALCULATED.3IONS-SCNC: 21 MMOL/L (ref 7–16)
ANISOCYTOSIS BLD QL SMEAR: ABNORMAL
BASOPHILS # BLD AUTO: 0.06 K/UL (ref 0–0.2)
BASOPHILS NFR BLD AUTO: 0.2 % (ref 0–1)
BUN SERPL-MCNC: 135 MG/DL (ref 7–18)
BUN/CREAT SERPL: 34 (ref 6–20)
CALCIUM SERPL-MCNC: 8.7 MG/DL (ref 8.5–10.1)
CHLORIDE SERPL-SCNC: 114 MMOL/L (ref 98–107)
CO2 SERPL-SCNC: 21 MMOL/L (ref 21–32)
CREAT SERPL-MCNC: 3.95 MG/DL (ref 0.7–1.3)
DIFFERENTIAL METHOD BLD: ABNORMAL
EGFR (NO RACE VARIABLE) (RUSH/TITUS): 15 ML/MIN/1.73M2
EOSINOPHIL # BLD AUTO: 0.01 K/UL (ref 0–0.5)
EOSINOPHIL NFR BLD AUTO: 0 % (ref 1–4)
ERYTHROCYTE [DISTWIDTH] IN BLOOD BY AUTOMATED COUNT: 15.5 % (ref 11.5–14.5)
GLUCOSE SERPL-MCNC: 108 MG/DL (ref 74–106)
HCT VFR BLD AUTO: 37 % (ref 40–54)
HGB BLD-MCNC: 11.3 G/DL (ref 13.5–18)
IMM GRANULOCYTES # BLD AUTO: 0.24 K/UL (ref 0–0.04)
IMM GRANULOCYTES NFR BLD: 0.8 % (ref 0–0.4)
LYMPHOCYTES # BLD AUTO: 0.66 K/UL (ref 1–4.8)
LYMPHOCYTES NFR BLD AUTO: 2.3 % (ref 27–41)
LYMPHOCYTES NFR BLD MANUAL: 3 % (ref 27–41)
MCH RBC QN AUTO: 29.8 PG (ref 27–31)
MCHC RBC AUTO-ENTMCNC: 30.5 G/DL (ref 32–36)
MCV RBC AUTO: 97.6 FL (ref 80–96)
MONOCYTES # BLD AUTO: 1.71 K/UL (ref 0–0.8)
MONOCYTES NFR BLD AUTO: 6 % (ref 2–6)
MONOCYTES NFR BLD MANUAL: 3 % (ref 2–6)
MPC BLD CALC-MCNC: 11.1 FL (ref 9.4–12.4)
NEUTROPHILS # BLD AUTO: 25.83 K/UL (ref 1.8–7.7)
NEUTROPHILS NFR BLD AUTO: 90.7 % (ref 53–65)
NEUTS SEG NFR BLD MANUAL: 94 % (ref 50–62)
NRBC # BLD AUTO: 0 X10E3/UL
NRBC, AUTO (.00): 0 %
PLATELET # BLD AUTO: 325 K/UL (ref 150–400)
PLATELET MORPHOLOGY: NORMAL
POTASSIUM SERPL-SCNC: 4.4 MMOL/L (ref 3.5–5.1)
RBC # BLD AUTO: 3.79 M/UL (ref 4.6–6.2)
SODIUM SERPL-SCNC: 152 MMOL/L (ref 136–145)
WBC # BLD AUTO: 28.51 K/UL (ref 4.5–11)

## 2023-06-27 PROCEDURE — 85025 COMPLETE CBC W/AUTO DIFF WBC: CPT

## 2023-06-27 PROCEDURE — 25000003 PHARM REV CODE 250

## 2023-06-27 PROCEDURE — 99233 PR SUBSEQUENT HOSPITAL CARE,LEVL III: ICD-10-PCS | Mod: GC,,, | Performed by: FAMILY MEDICINE

## 2023-06-27 PROCEDURE — 99233 SBSQ HOSP IP/OBS HIGH 50: CPT | Mod: GC,,, | Performed by: FAMILY MEDICINE

## 2023-06-27 PROCEDURE — 99223 1ST HOSP IP/OBS HIGH 75: CPT | Mod: ,,, | Performed by: INTERNAL MEDICINE

## 2023-06-27 PROCEDURE — 92610 EVALUATE SWALLOWING FUNCTION: CPT

## 2023-06-27 PROCEDURE — 99223 PR INITIAL HOSPITAL CARE,LEVL III: ICD-10-PCS | Mod: ,,, | Performed by: INTERNAL MEDICINE

## 2023-06-27 PROCEDURE — C9113 INJ PANTOPRAZOLE SODIUM, VIA: HCPCS

## 2023-06-27 PROCEDURE — 80048 BASIC METABOLIC PNL TOTAL CA: CPT

## 2023-06-27 PROCEDURE — 63600175 PHARM REV CODE 636 W HCPCS

## 2023-06-27 PROCEDURE — 25000003 PHARM REV CODE 250: Performed by: FAMILY MEDICINE

## 2023-06-27 PROCEDURE — 11000001 HC ACUTE MED/SURG PRIVATE ROOM

## 2023-06-27 RX ORDER — ONDANSETRON 2 MG/ML
4 INJECTION INTRAMUSCULAR; INTRAVENOUS EVERY 6 HOURS PRN
Status: DISCONTINUED | OUTPATIENT
Start: 2023-06-27 | End: 2023-06-28 | Stop reason: HOSPADM

## 2023-06-27 RX ORDER — ZINC SULFATE 50(220)MG
220 CAPSULE ORAL DAILY
Status: DISCONTINUED | OUTPATIENT
Start: 2023-06-28 | End: 2023-06-28 | Stop reason: HOSPADM

## 2023-06-27 RX ORDER — PANTOPRAZOLE SODIUM 40 MG/10ML
40 INJECTION, POWDER, LYOPHILIZED, FOR SOLUTION INTRAVENOUS 2 TIMES DAILY
Status: DISCONTINUED | OUTPATIENT
Start: 2023-06-27 | End: 2023-06-28 | Stop reason: HOSPADM

## 2023-06-27 RX ORDER — FINASTERIDE 5 MG/1
5 TABLET, FILM COATED ORAL DAILY
Status: DISCONTINUED | OUTPATIENT
Start: 2023-06-28 | End: 2023-06-28 | Stop reason: HOSPADM

## 2023-06-27 RX ORDER — ATORVASTATIN CALCIUM 80 MG/1
80 TABLET, FILM COATED ORAL DAILY
Status: DISCONTINUED | OUTPATIENT
Start: 2023-06-28 | End: 2023-06-28 | Stop reason: HOSPADM

## 2023-06-27 RX ORDER — DEXTROSE MONOHYDRATE 50 MG/ML
INJECTION, SOLUTION INTRAVENOUS CONTINUOUS
Status: DISCONTINUED | OUTPATIENT
Start: 2023-06-27 | End: 2023-06-28

## 2023-06-27 RX ORDER — CLOPIDOGREL BISULFATE 75 MG/1
75 TABLET ORAL DAILY
Status: DISCONTINUED | OUTPATIENT
Start: 2023-06-28 | End: 2023-06-28

## 2023-06-27 RX ORDER — DEXAMETHASONE 0.5 MG/5ML
1 SOLUTION ORAL 4 TIMES DAILY
Status: DISCONTINUED | OUTPATIENT
Start: 2023-06-28 | End: 2023-06-28

## 2023-06-27 RX ORDER — LEVOTHYROXINE SODIUM 75 UG/1
150 TABLET ORAL EVERY OTHER DAY
Status: DISCONTINUED | OUTPATIENT
Start: 2023-06-28 | End: 2023-06-28 | Stop reason: HOSPADM

## 2023-06-27 RX ADMIN — CLOPIDOGREL BISULFATE 75 MG: 75 TABLET ORAL at 08:06

## 2023-06-27 RX ADMIN — PANTOPRAZOLE SODIUM 40 MG: 40 INJECTION, POWDER, FOR SOLUTION INTRAVENOUS at 08:06

## 2023-06-27 RX ADMIN — SODIUM CHLORIDE: 4.5 INJECTION, SOLUTION INTRAVENOUS at 05:06

## 2023-06-27 RX ADMIN — ZINC SULFATE 220 MG (50 MG) CAPSULE 220 MG: CAPSULE at 08:06

## 2023-06-27 RX ADMIN — TAMSULOSIN HYDROCHLORIDE 0.4 MG: 0.4 CAPSULE ORAL at 08:06

## 2023-06-27 RX ADMIN — PIPERACILLIN AND TAZOBACTAM 4.5 G: 4; .5 INJECTION, POWDER, FOR SOLUTION INTRAVENOUS; PARENTERAL at 02:06

## 2023-06-27 RX ADMIN — HEPARIN SODIUM 5000 UNITS: 5000 INJECTION, SOLUTION INTRAVENOUS; SUBCUTANEOUS at 02:06

## 2023-06-27 RX ADMIN — MUPIROCIN: 20 OINTMENT TOPICAL at 09:06

## 2023-06-27 RX ADMIN — DEXTROSE MONOHYDRATE: 50 INJECTION, SOLUTION INTRAVENOUS at 08:06

## 2023-06-27 RX ADMIN — FINASTERIDE 5 MG: 5 TABLET, FILM COATED ORAL at 08:06

## 2023-06-27 RX ADMIN — HEPARIN SODIUM 5000 UNITS: 5000 INJECTION, SOLUTION INTRAVENOUS; SUBCUTANEOUS at 05:06

## 2023-06-27 RX ADMIN — DEXTROSE MONOHYDRATE: 50 INJECTION, SOLUTION INTRAVENOUS at 09:06

## 2023-06-27 RX ADMIN — ATORVASTATIN CALCIUM 80 MG: 80 TABLET, FILM COATED ORAL at 08:06

## 2023-06-27 RX ADMIN — LEVOTHYROXINE SODIUM 137 MCG: 0.11 TABLET ORAL at 05:06

## 2023-06-27 RX ADMIN — MUPIROCIN: 20 OINTMENT TOPICAL at 08:06

## 2023-06-27 RX ADMIN — HEPARIN SODIUM 5000 UNITS: 5000 INJECTION, SOLUTION INTRAVENOUS; SUBCUTANEOUS at 09:06

## 2023-06-27 NOTE — PROGRESS NOTES
[facility] - Short Stay [facility] Medicine  Progress Note    Patient Name: Kerwin Wilson  MRN: [id number]  Patient Class: IP- Inpatient   Admission Date: 6/26/2023  Length of Stay: 1 days  Attending Physician: Leidy Pate DO  Primary Care Provider: Chito Leblanc DO        Subjective:     Principal Problem:ALEXANDER (acute kidney injury)        HPI:  Patient is a 72yo male with a PMH of RCC diagnosed in 2022 with mets per bone scan and MRI, s/p nephrectomy on the right and partial bladder resection, palliative radiation of left hip, 2 rounds of chemo, thyroid disorder on synthroid, CVA with residual expressive aphasia on Plavix and statin, HLD, vascular dementia, BPH s/p TURP who presents to [facility] ED with decreased appetite, PO intake, vomiting, weakness, dehydration, and sore throat. Patient was sent from Dr. Leblanc's office for dehydration and IV fluids. He was tachycardic and hypotension at Dr. Leblanc's office. All symptoms started after initiating immunotherapy infusion by Oncology Dr. Wiggins for his RCC 7 days ago. Patient sees Dr. Wiggins and had palliative radiation and chemo done.    Patient denies f/c/n/v/d/cp/sob. Symptoms started 7 days ago that gradually worsened. Initially patient and sister in room thought it was flu but symptoms kept worsening including general weakness and decreased PO intake. Endorses nasal congestion and discharge that improved on Claritin D. Endorses inability to keep food down even yogurt and gatorade. Reports weight loss of about 20 lbs since October 2022. Of note, patient moved here from [place] last year.    In the ED, VSS except P 110, R 20, /75. Labs remarkable for WBC 29.57, H/H 11.9/39.5, . Na 148, Cl 109, BUN/Cr 128/3.98. Lactic acid 2.2. Patient received NS 1L bolus, Zofran 4mg IV, Zosyn 4.5g. He is admitted for hospital medicine for further management and care.       Overview/Hospital Course:  No notes on file    Interval History: No acute  events O/N. However, patient hasn't really been able to eat and swallow the clear liquid diet. Speech eval recommends NPO due to inability to swallow, alternate route for feeding, and GI evaluation. GI consulted for potential EGD with dilation vs PEG placement. Discussed with patient for NGT vs PEG placement, he agrees to having NGT. NGT placed and diet consulted again for feeding. Will feed and give medications through NGT. WBC and Cr mildly improving. HR improving. Diet consult says patient met criteria for severe protein-calorie malnutrition. Repeat lactic acid, UA, and bladder scan pending. Patient reports he's urinating fine. CXR negative for acute findings. After 2L NS boluses last night, had half NS for hours but switched to D5W@115cc/hr due to Na and Cl at 152 and 114. Monitoring CBC and BMP.    Review of Systems   Constitutional:  Positive for appetite change and unexpected weight change (weight loss). Negative for chills, diaphoresis and fever.   HENT:  Positive for congestion, rhinorrhea, sore throat and trouble swallowing.         Whispers at baseline s/p CVA   Eyes:  Negative for visual disturbance.   Respiratory:  Negative for shortness of breath.    Cardiovascular:  Negative for chest pain.   Gastrointestinal:  Positive for nausea and vomiting. Negative for abdominal pain.   Genitourinary:  Negative for dysuria and hematuria.   Musculoskeletal:  Negative for back pain.   Skin:  Negative for wound.   Neurological:  Positive for weakness.   Psychiatric/Behavioral:  Negative for sleep disturbance.    Objective:     Vital Signs (Most Recent):  Temp: 98.3 °F (36.8 °C) (06/27/23 1200)  Pulse: 97 (06/27/23 1200)  Resp: 19 (06/27/23 1200)  BP: 131/75 (06/27/23 1200)  SpO2: 96 % (06/27/23 1200) Vital Signs (24h Range):  Temp:  [96.5 °F (35.8 °C)-98.3 °F (36.8 °C)] 98.3 °F (36.8 °C)  Pulse:  [] 97  Resp:  [18-30] 19  SpO2:  [94 %-97 %] 96 %  BP: (131-145)/(75-90) 131/75     Weight: 68 kg (150 lb)  Body  mass index is 20.34 kg/m².    Intake/Output Summary (Last 24 hours) at 6/27/2023 1433  Last data filed at 6/27/2023 0745  Gross per 24 hour   Intake 175 ml   Output --   Net 175 ml         Physical Exam  Vitals and nursing note reviewed.   Constitutional:       General: He is not in acute distress.     Appearance: He is ill-appearing. He is not toxic-appearing or diaphoretic.      Comments: Regurgitating food during exam   HENT:      Head: Normocephalic and atraumatic.      Right Ear: External ear normal.      Left Ear: External ear normal.      Nose: Nose normal.      Mouth/Throat:      Mouth: Mucous membranes are dry.      Pharynx: Oropharynx is clear. No oropharyngeal exudate.   Eyes:      General: No scleral icterus.     Extraocular Movements: Extraocular movements intact.      Pupils: Pupils are equal, round, and reactive to light.   Cardiovascular:      Rate and Rhythm: Normal rate and regular rhythm.      Pulses: Normal pulses.      Heart sounds: Normal heart sounds. No murmur heard.  Pulmonary:      Effort: Pulmonary effort is normal. No respiratory distress.      Breath sounds: Rales present.   Abdominal:      General: Abdomen is flat. Bowel sounds are normal. There is no distension.      Palpations: Abdomen is soft.      Tenderness: There is no abdominal tenderness.   Musculoskeletal:         General: No deformity.      Cervical back: Neck supple.   Skin:     General: Skin is warm and dry.      Coloration: Skin is not jaundiced.   Neurological:      Mental Status: He is alert and oriented to person, place, and time.   Psychiatric:         Behavior: Behavior normal.           Significant Labs: All pertinent labs within the past 24 hours have been reviewed.    Significant Imaging: I have reviewed all pertinent imaging results/findings within the past 24 hours.      Assessment/Plan:      * ALEXANDER (acute kidney injury)  Patient with acute kidney injury/acute renal failure likely due to pre-renal azotemia due to  dehydration ALEXANDER is currently worsening. Baseline creatinine 1.48 on 12/29/22. - Labs reviewed- Renal function/electrolytes with Estimated Creatinine Clearance: 15.8 mL/min (A) (based on SCr of 3.95 mg/dL (H)). according to latest data. Monitor urine output and serial BMP and adjust therapy as needed. Avoid nephrotoxins and renally dose meds for GFR listed above.    Baseline Cr 1.48 and GFR 50 on 12/29/22  BUN/Cr = 32 2/2 dehydration and decreased PO intake s/p immunotherapy 1 week ago for RCC  Telemetry  IV half NS@100cc/hr for 2 days (maintenace rate)  Avoid nephrotoxic drugs  Renal dose medications  Trend BMP  Consider UA, urine electrolytes, urine protein/Cr if no improvement    6/27: Cr 3.95 mildly improving. UA and bladder scan pending. Patient reports he's urinating fine. After 2L NS boluses last night, had half NS for hours but switched to D5W@115cc/hr due to Na and Cl at 152 and 114. Monitor BMP and UOP.     Difficulty swallowing  patient hasn't really been able to eat and swallow the clear liquid diet.   Speech eval recommends NPO due to inability to swallow, alternate route for feeding, and GI evaluation.   GI consulted for potential EGD with dilation vs PEG placement, appreciate assistance  Discussed with patient for NGT vs PEG placement, he agrees to having NGT.  NGT pending and diet consulted for feeding. Will feed and give medications through NGT.       Leukocytosis  Likely 2/2 to RCC with mets  Blood cultures x2 pending, Zosyn 4.5g once in ED.  NS 1L bolus to make up 30cc/kg for 68kg = 2040cc  CXR, UA pending  Half NS@100cc/hr after bolus    6/27: WBC 28.51 mildly improving. HR improving. Repeat lactic acid, UA, and bladder scan pending. CXR negative for acute findings.           Dehydration  From decreased PO intake and difficulty swallowing   See ALEXANDER  See difficulty swallowing  NPO diet       Moderate protein-calorie malnutrition  Nutrition consulted. Most recent weight and BMI monitored-      Measurements:  Wt Readings from Last 1 Encounters:   06/26/23 68 kg (150 lb)   Body mass index is 20.34 kg/m².    Patient has been screened and assessed by RD     Malnutrition Type:  Context: chronic illness  Level:      Malnutrition Characteristic Summary:  Weight Loss (Malnutrition): greater than 5% in 1 month  Energy Intake (Malnutrition): less than or equal to 75% for greater than or equal to 1 month  Subcutaneous Fat (Malnutrition): severe depletion  Muscle Mass (Malnutrition): severe depletion      Interventions/Recommendations (treatment strategy):  Recommend starting Suplena with a goal rate of 40mL/hour with 40mL/hour free water flush. Will monitor labs and make additional recommendations as appropriate. Keep HOB at 30 degrees. Start rate at 10mL/hour and advance slowly by 10mL q8h until goal rate is reached. Monitor for tolerance: n/v/d/c. Check residuals q12h. Hold feeding if >500.       Acute-on-chronic kidney injury  See ALEXANDER      Hypothyroidism  Home synthroid 137 and 150 qod      History of stroke  Home Plavix 75 and lipitor 80 qd      Dyslipidemia  Home lipitor 80 qd      BPH (benign prostatic hyperplasia)  S/p TURP  Home finasteride 5 qd, Tamsulosin 0.4 qd      History of renal cell carcinoma  Sees Dr. Wiggins Oncology OP        VTE Risk Mitigation (From admission, onward)         Ordered     heparin (porcine) injection 5,000 Units  Every 8 hours         06/26/23 1617     IP VTE HIGH RISK PATIENT  Once         06/26/23 1617     Place sequential compression device  Until discontinued         06/26/23 1617                Discharge Planning   VALENTINO:      Code Status: Full Code   Is the patient medically ready for discharge?:     Reason for patient still in hospital (select all that apply): Patient unstable, Patient trending condition, Laboratory test, Treatment, Consult recommendations and Pending disposition  Discharge Plan A: Home with family                  Atif Valdez DO  Department of Hospital  Medicine   [facility] - Short Stay Unit

## 2023-06-27 NOTE — PT/OT/SLP EVAL
Speech Language Pathology Evaluation  Bedside Swallow    Patient Name:  Kerwin Wilson   MRN:  [id number]  Admitting Diagnosis: ALEXANDER (acute kidney injury)    Recommendations:                 General Recommendations:  GI evaluation  Diet recommendations:  NPO (Pt needs alternate route of feeding for nutrition secondary to pt reports he has not been able to keep anything down for 3-4 weeks now.), NPO   Aspiration Precautions: Alternate means of nutrition/hydration   General Precautions: Standard,    Communication strategies:   Patient's voice is mostly a whisper. Patient reports it has been that way since his stroke ~ 2 years ago.     Assessment:     Kerwin Wilson is a 73 y.o. male with an SLP diagnosis of  possible dysphagia/vomiting .  He presents with not being able to keep any trials down during BEDSIDE SWALLOW EVALUATION and spitting them back out into a cup.    History:     Past Medical History:   Diagnosis Date    Disorder of thyroid, unspecified     History of renal cell carcinoma 01/2022    With mets per bone scan and MRI imaging Followed by Dr. Wiggins    Left leg pain 12/29/2022    5 months     Mixed hyperlipidemia     Stroke 11/2021    vascular dementia        Past Surgical History:   Procedure Laterality Date    BLADDER SURGERY  2022    portion of bladder removed    KIDNEY SURGERY Right 2022    kidney removal    TONSILLECTOMY      TRANSURETHRAL RESECTION OF PROSTATE  02/2023       Social History: Patient lives with his sister.    Prior Intubation HX:  n/a    Modified Barium Swallow: Patient would not be able to tolerate a MODIFIED BARIUM SWALLOW  study at this time.    Chest X-Rays: see chart    Prior diet: Patient reports he hasn't been able to eat/drink much at all for 3-4 weeks secondary to not being able to keep anything down.    Occupation/hobbies/homemaking: not stated.    Subjective     Patient lying in bed awake and alert. Patient spitting up secretions into his water pitcher. Patient says he won't be  able to keep any food/liquid down but is agreeable to try BEDSIDE SWALLOW EVALUATION.  Patient goals: not stated     Pain/Comfort:       Respiratory Status: Room air    Objective:     Oral Musculature Evaluation  Oral Musculature: general weakness  Secretion Management: other (see comments) (spitting secretions into a cup)  Oral Labial Strength and Mobility: WFL  Lingual Strength and Mobility: WFL    Bedside Swallow Eval:   Consistencies Assessed:  Thin liquids Patient ate one bite of orange jello. Jello began to come back up almost immediately after he swallowed. He spit it into a cup and some even came back out of his nose. Patient took one sip of water, and it did the same thing. Patient reported this was a sudden onset ~ 3-4 weeks. He reports he hasn't been able to eat/drink much at all since then and has lost a good bit of weight. Patient is at high risk of aspiration secondary to food/liquid almost immediately refluxing back up when he swallows.       Oral Phase:   Nasal reflux  Poor oral acceptance    Pharyngeal Phase:   coughing/choking  regurgitation of food/liquids    Compensatory Strategies  None    Treatment: Rec GI consult to further assess. Rec alternate route of feeding for nutrition/meds at this time. Reevaluate as needed.    Goals:   Multidisciplinary Problems       SLP Goals       Not on file                    Plan:     Patient to be seen:      Plan of Care expires:     Plan of Care reviewed with:      SLP Follow-Up:  No (as needed)       Discharge recommendations:      Barriers to Discharge:  Decreased Care Giver Support    Time Tracking:     SLP Treatment Date:      Speech Start Time:  0915  Speech Stop Time:  0945     Speech Total Time (min):  30 min    Billable Minutes: Eval Swallow and Oral Function 30 06/27/2023

## 2023-06-27 NOTE — CONSULTS
"Gastroenterology Consult Note    Chief Complaint: ALEXANDER (acute kidney injury)    Consulted by:   Dr. Pate    HPI:  Kerwin Wilson is a 73 y.o. WM with h/o stroke with residual mild dementia per wife and metastatic RCC (bone, liver) followed by Dr. Wiggins that presents from Dr. Leblanc's clinic for relatively acute dysphagia/odynophagia. History per wife as patient is unable to produce much more than a whisper due to pain - this is not his baseline (wife reports he normally speaks well and can eat anything - this was the case until 1-2 weeks ago). She reports he was started on his first round of immunotherapy on or around 6/12, and that is when the symptoms started and have gradually progressed. He has lost 10-lbs due to inability to tolerate food due to pain and not being able to swallow. No prior issues with dysphagia. No prior EGD. No hematemesis. SLT evaluation today and was unable to tolerate any consistency. Wife reports that 2-3 weeks ago he was much more "healthy" and they were able to kayak in the river.     Review of Systems   Constitutional:  Negative for weight loss.   Gastrointestinal:  Positive for abdominal pain. Negative for blood in stool, constipation, diarrhea, heartburn, melena and nausea.   All other systems reviewed and are negative.    Past Medical History:   Diagnosis Date    Disorder of thyroid, unspecified     History of renal cell carcinoma 01/2022    With mets per bone scan and MRI imaging Followed by Dr. Wiggins    Left leg pain 12/29/2022    5 months     Mixed hyperlipidemia     Stroke 11/2021    vascular dementia      Past Surgical History:   Procedure Laterality Date    BLADDER SURGERY  2022    portion of bladder removed    KIDNEY SURGERY Right 2022    kidney removal    TONSILLECTOMY      TRANSURETHRAL RESECTION OF PROSTATE  02/2023     Family History   Problem Relation Age of Onset    Hypothyroidism Mother     Dementia Mother     Heart attack Father 52    Diabetes Paternal Grandfather     " Colon cancer Paternal Grandfather        OBJECTIVE:  /77   Pulse 90   Temp 98.4 °F (36.9 °C)   Resp 19   Ht 6' (1.829 m)   Wt 68 kg (150 lb)   SpO2 96%   BMI 20.34 kg/m²   GEN: non toxic appearing, cooperative, in mild pain (mouth)   HEENT: NCAT, OP benign, dry MM - no ulcerations or candida   NECK: Supple, no LAD  CV: normal rate, regular rhythm, no m/r/g  RESP: CTA bilaterally, unlabored  ABD: NABS, ND, +TTP in epigastrium, soft, voluntary guarding  EXT: No clubbing, cyanosis, or edema.  SKIN: Warm and dry  NEURO: AAO x4. Afocal.    LABS:  Recent Results (from the past 336 hour(s))   CBC with Differential    Collection Time: 06/27/23  2:29 AM   Result Value Ref Range    WBC 28.51 (HH) 4.50 - 11.00 K/uL    Hemoglobin 11.3 (L) 13.5 - 18.0 g/dL    Hematocrit 37.0 (L) 40.0 - 54.0 %    Platelet Count 325 150 - 400 K/uL   CBC with Differential    Collection Time: 06/26/23 12:31 PM   Result Value Ref Range    WBC 29.57 (HH) 4.50 - 11.00 K/uL    Hemoglobin 11.9 (L) 13.5 - 18.0 g/dL    Hematocrit 39.5 (L) 40.0 - 54.0 %    Platelet Count 415 (H) 150 - 400 K/uL     CMP  Sodium   Date Value Ref Range Status   06/27/2023 152 (H) 136 - 145 mmol/L Final     Potassium   Date Value Ref Range Status   06/27/2023 4.4 3.5 - 5.1 mmol/L Final     Chloride   Date Value Ref Range Status   06/27/2023 114 (H) 98 - 107 mmol/L Final     CO2   Date Value Ref Range Status   06/27/2023 21 21 - 32 mmol/L Final     Glucose   Date Value Ref Range Status   06/27/2023 108 (H) 74 - 106 mg/dL Final     BUN   Date Value Ref Range Status   06/27/2023 135 (H) 7 - 18 mg/dL Final     Creatinine   Date Value Ref Range Status   06/27/2023 3.95 (H) 0.70 - 1.30 mg/dL Final     Calcium   Date Value Ref Range Status   06/27/2023 8.7 8.5 - 10.1 mg/dL Final     Total Protein   Date Value Ref Range Status   06/26/2023 8.5 (H) 6.4 - 8.2 g/dL Final     Albumin   Date Value Ref Range Status   06/26/2023 2.3 (L) 3.5 - 5.0 g/dL Final     Bilirubin, Total    Date Value Ref Range Status   06/26/2023 0.8 >0.0 - 1.2 mg/dL Final     Alk Phos   Date Value Ref Range Status   06/26/2023 499 (H) 45 - 115 U/L Final     AST   Date Value Ref Range Status   06/26/2023 150 (H) 15 - 37 U/L Final     ALT   Date Value Ref Range Status   06/26/2023 113 (H) 16 - 61 U/L Final     Anion Gap   Date Value Ref Range Status   06/27/2023 21 (H) 7 - 16 mmol/L Final     eGFR   Date Value Ref Range Status   06/27/2023 15 (L) >=60 mL/min/1.73m2 Final     No results found for: INR, PROTIME    IMAGING:    CT Abd/Pelvis  Marked improvement of the retroperitoneal bulky adenopathy with no residual enlarged lymph nodes identified.     Worsening hepatic metastases.     Increase in the degree of osseous metastases although decrease in the soft tissue component adjacent to the left pubic bone metastasis.    ASSESSMENT:    73 y.o. WM with h/o stroke with residual mild dementia per wife and metastatic RCC (bone, liver) followed by Dr. Wiggins that presents from Dr. Leblanc's clinic for relatively acute dysphagia/odynophagia.     PLAN:    Dysphagia, Odynophagia  - acute onset in last 1-2 weeks after starting immunotherapy   - favor infectious esophagitis vs mucositis related to immunotherapy   - agree with PPI; will start DEX swish and spit to see if this helps  - if no improvement, plan for EGD tomorrow to assess for fungal/viral etiology   - lower suspicion for a peptic or malignant stricture given rapid onset of symptoms   - may need to alert Dr. Wiggins to patient's presentation given onset of sx's after immunotherapy   - NPO/hold tube feeds at midnight for EGD tomorrow   - will not be able to dilate due to Plavix dose 2-3 days ago; they are aware and want to proceed      Thank you for the consult and including me in the care of this patient. Please call me with questions.     Constantino Cardoso MD  Gastroenterology

## 2023-06-27 NOTE — ASSESSMENT & PLAN NOTE
Patient with acute kidney injury/acute renal failure likely due to pre-renal azotemia due to dehydration ALEXANDER is currently worsening. Baseline creatinine 1.48 on 12/29/22. - Labs reviewed- Renal function/electrolytes with Estimated Creatinine Clearance: 15.8 mL/min (A) (based on SCr of 3.95 mg/dL (H)). according to latest data. Monitor urine output and serial BMP and adjust therapy as needed. Avoid nephrotoxins and renally dose meds for GFR listed above.    Baseline Cr 1.48 and GFR 50 on 12/29/22  BUN/Cr = 32 2/2 dehydration and decreased PO intake s/p immunotherapy 1 week ago for RCC  Telemetry  IV half NS@100cc/hr for 2 days (maintenace rate)  Avoid nephrotoxic drugs  Renal dose medications  Trend BMP  Consider UA, urine electrolytes, urine protein/Cr if no improvement    6/27: Cr 3.95 mildly improving. UA and bladder scan pending. Patient reports he's urinating fine. After 2L NS boluses last night, had half NS for hours but switched to D5W@115cc/hr due to Na and Cl at 152 and 114. Monitor BMP and UOP.

## 2023-06-27 NOTE — PLAN OF CARE
[facility] - Short Stay Unit  Initial Discharge Assessment       Primary Care Provider: Chito Leblanc DO    Admission Diagnosis: Dehydration [E86.0]  Chest pain [R07.9]    Admission Date: 6/26/2023  Expected Discharge Date:     Transition of Care Barriers: None    Payor: [facility] MCARE / Plan: [employer] MEDICARE ADVANTAGE / Product Type: Medicare Advantage /     Extended Emergency Contact Information  Primary Emergency Contact: lenore tejada  Address: [address] [place] [address] [place]  Mobile Phone: [phone]  Relation: Sister  Preferred language: English   needed? No  Secondary Emergency Contact: PAULETTE BELLE  Mobile Phone: [phone]  Relation: Grandchild  Preferred language: English   needed? No    Discharge Plan A: Home with family  Discharge Plan B: Home with family      [id number], [place] - [address] AT [facility] [address]  [address]  [place] [address]  Phone: [phone] Fax: [phone]    [facility], [place] - [address]  [address]  [facility] [address]  Phone: [phone] Fax: [phone]      Initial Assessment (most recent)       Adult Discharge Assessment - 06/27/23 1156          Discharge Assessment    Assessment Type Discharge Planning Assessment     Source of Information patient     People in Home sibling(s)     Do you expect to return to your current living situation? Yes     Do you have help at home or someone to help you manage your care at home? Yes     Who are your caregiver(s) and their phone number(s)? Lenore Langley (sister) [phone]     Prior to hospitilization cognitive status: Alert/Oriented     Current cognitive status: Alert/Oriented     Walking or Climbing Stairs ambulation difficulty, assistance 1 person     Home Accessibility wheelchair accessible     Home Layout Able to live on 1st floor     Equipment  Currently Used at Home walker, rolling;cane, straight     Readmission within 30 days? No     Patient currently being followed by outpatient case management? No     Do you currently have service(s) that help you manage your care at home? No     Do you take prescription medications? Yes     Do you have prescription coverage? Yes     Do you have any problems affording any of your prescribed medications? No     Is the patient taking medications as prescribed? yes     Who is going to help you get home at discharge? Lenore Langley (sister) [phone]     How do you get to doctors appointments? family or friend will provide     Are you on dialysis? No     Do you take coumadin? No     Discharge Plan A Home with family     Discharge Plan B Home with family     DME Needed Upon Discharge  none     Discharge Plan discussed with: Patient     Transition of Care Barriers None        Physical Activity    On average, how many days per week do you engage in moderate to strenuous exercise (like a brisk walk)? 0 days     On average, how many minutes do you engage in exercise at this level? 0 min        Financial Resource Strain    How hard is it for you to pay for the very basics like food, housing, medical care, and heating? Not hard at all        Housing Stability    In the last 12 months, was there a time when you were not able to pay the mortgage or rent on time? No     In the last 12 months, how many places have you lived? 1     In the last 12 months, was there a time when you did not have a steady place to sleep or slept in a shelter (including now)? No        Transportation Needs    In the past 12 months, has lack of transportation kept you from medical appointments or from getting medications? No     In the past 12 months, has lack of transportation kept you from meetings, work, or from getting things needed for daily living? No        Food Insecurity    Within the past 12 months, you worried that your food would run out before  you got the money to buy more. Never true     Within the past 12 months, the food you bought just didn't last and you didn't have money to get more. Never true        Stress    Do you feel stress - tense, restless, nervous, or anxious, or unable to sleep at night because your mind is troubled all the time - these days? Not at all        Social Connections    In a typical week, how many times do you talk on the phone with family, friends, or neighbors? More than three times a week     How often do you get together with friends or relatives? More than three times a week     How often do you attend Alevism or Religion services? Never     Do you belong to any clubs or organizations such as Alevism groups, unions, fraternal or athletic groups, or school groups? No     How often do you attend meetings of the clubs or organizations you belong to? Never     Are you , , , , never , or living with a partner?         Alcohol Use    Q1: How often do you have a drink containing alcohol? Never     Q2: How many drinks containing alcohol do you have on a typical day when you are drinking? Patient does not drink     Q3: How often do you have six or more drinks on one occasion? Never        OTHER    Name(s) of People in Home Lenore Langley (sister) [phone]                   Patient lives at home with his sister, Lenore. He has a walker and cane for home use. No HH pta. Plans to return home at discharge. SDOH complete. SS following for discharge needs as arise.

## 2023-06-27 NOTE — ASSESSMENT & PLAN NOTE
patient hasn't really been able to eat and swallow the clear liquid diet.   Speech eval recommends NPO due to inability to swallow, alternate route for feeding, and GI evaluation.   GI consulted for potential EGD with dilation vs PEG placement, appreciate assistance  Discussed with patient for NGT vs PEG placement, he agrees to having NGT.  NGT pending and diet consulted for feeding. Will feed and give medications through NGT.

## 2023-06-27 NOTE — ASSESSMENT & PLAN NOTE
From decreased PO intake and difficulty swallowing   See ALEXANDER  See difficulty swallowing  NPO diet

## 2023-06-27 NOTE — NURSING
Rec'd pt alert and awake.  VSS. No complaints of pain.  All lines and tubes intact.  Positioned for comfort and safety.  Care ongoing.

## 2023-06-27 NOTE — CONSULTS
[facility] - Short Stay Unit  Adult Nutrition  Consult Note         Reason for Assessment    Consult  Nutrition Risk Screen: difficulty chewing/swallowing     Consult received and appreciated. Patient seen for weight loss/decreased intake.    RD visited patient this morning. ST was at bedside. See ST evaluation for details.   Patient reports he has been having n/v for 3-4 weeks with swallowing problems. RD observed patient coughing on water and jello with nose running as he was coughing and spitting up his bedside cup.      Patient has lost 15 pounds in one month.     If patient unable to eat po foods/fluids recommend start alternate routes of nutrition. Consult RD as needed.     Patient with severe weight loss of 9.1% x 1 month with less than 75% of energy intake x 1 month.    NFPE findings:  Severe fat depletion noted in orbital, upper arm and thoracic and lumbar regions    Severe muscle depletion noted in temple, clavical bone, scapular bone and patellar regions.     Moderate depletion of muscle noted in dorsal hand region.     Patient meets ASPEN criteria fopr Severe Protein-calorie Malnutrition.         Malnutrition Assessment  Malnutrition Context: chronic illness          Weight Loss (Malnutrition): greater than 5% in 1 month  Energy Intake (Malnutrition): less than or equal to 75% for greater than or equal to 1 month  Subcutaneous Fat (Malnutrition): severe depletion  Muscle Mass (Malnutrition): severe depletion   Orbital Region (Subcutaneous Fat Loss): severe depletion  Upper Arm Region (Subcutaneous Fat Loss): severe depletion  Thoracic and Lumbar Region: severe depletion   Sikh Region (Muscle Loss): severe depletion  Clavicle Bone Region (Muscle Loss): severe depletion  Clavicle and Acromion Bone Region (Muscle Loss): severe depletion  Scapular Bone Region (Muscle Loss): severe depletion  Dorsal Hand (Muscle Loss): moderate depletion  Patellar Region (Muscle Loss): severe depletion                       Skin Integrity  Zane Risk Assessment  Sensory Perception: 4-->no impairment  Moisture: 4-->rarely moist  Activity: 3-->walks occasionally  Mobility: 3-->slightly limited  Nutrition: 2-->probably inadequate  Friction and Shear: 3-->no apparent problem  Zane Score: 19    Nutrition Diagnosis  Inadequate energy intake   related to Dysphagia/ difficulty swallowing and Nausea / Vomiting as evidenced by significant weight loss with visible muscle and fat wasting    Nutrition Diagnosis Status: Chronic/ continues      Comments: ST evaluating- recommend alternate routes of nutrition if unable to eat po  Nutrition Risk  Level of Risk/Frequency of Follow-up: high  Comments on nutrition risk: trouble swallowing/ poor intake and weight loss   Recent Labs   Lab 06/27/23  0229   *     Comments on Glucose: elevated with stress response due to recent illness  Nutrition Prescription / Recommendations  Recommendation/Intervention: Recommend advancing diet as tolerated to a regular diet. Patient is ordered Ensure Max Protein with meals. Please send ensure clear while on a clerar liquid diet.  Goals: weight maintenance, tolerance of diet upgrade  Nutrition Goal Status: new  Current Diet Order: clear liquid  Oral Nutrition Supplement: Ensure Max Protein  Chewing or Swallowing Difficulty?: Swallowing difficulty  Recommended Diet: NPO  Pending swallow eval  Recommended Oral Supplement: No Oral Supplements  Is Nutrition Support Recommended: No  Is Education Recommended: No  Monitor and Evaluation  % current Intake: Unknown/ No P.O. intake documented  % intake to meet estimated needs: 75 - 100 %  Food and Nutrient Intake: food and beverage intake  Food and Nutrient Adminstration: diet order  Anthropometric Measurements: weight, weight change, body mass index  Biochemical Data, Medical Tests and Procedures: electrolyte and renal panel, gastrointestinal profile, glucose/endocrine profile, inflammatory profile, lipid  profile  Nutrition-Focused Physical Findings: overall appearance     Current Medical Diagnosis and Past Medical History  Diagnosis: stroke/CVA  Past Medical History:   Diagnosis Date    Disorder of thyroid, unspecified     History of renal cell carcinoma 01/2022    With mets per bone scan and MRI imaging Followed by Dr. Wiggins    Left leg pain 12/29/2022    5 months     Mixed hyperlipidemia     Stroke 11/2021    vascular dementia      Nutrition/Diet History  Food Allergies: NKFA  Factors Affecting Nutritional Intake: clear liquid diet  Lab/Procedures/Meds  Recent Labs   Lab 06/26/23  1231 06/27/23  0229   * 152*   K 4.4 4.4   * 135*   CREATININE 3.98* 3.95*   CALCIUM 9.5 8.7   ALBUMIN 2.3*  --    * 114*   *  --    *  --    Note: NA elevated and cl elevated due to fluid status- rehydrate as appropriate. BUN and crea elevated with ALEXANDER    Last A1c: No results found for: HGBA1C  Lab Results   Component Value Date    RBC 3.79 (L) 06/27/2023    HGB 11.3 (L) 06/27/2023    HCT 37.0 (L) 06/27/2023    MCV 97.6 (H) 06/27/2023    MCH 29.8 06/27/2023    MCHC 30.5 (L) 06/27/2023     Pertinent Labs Reviewed: reviewed  Pertinent Labs Comments: Sodium: 152 (H)  Potassium: 4.4  Chloride: 114 (H)  CO2: 21  Anion Gap: 21 (H)  BUN: 135 (H)  Creatinine: 3.95 (H)  BUN/CREAT RATIO: 34 (H)  eGFR: 15 (L)  Glucose: 108 (H)  Calcium: 8.7  Pertinent Medications Reviewed: reviewed  Pertinent Medications Comments: tamsulosin, atrovastatin, clopidogrel, finastride, Znso4, levothyroxine, zosyn, heparin  Anthropometrics  Temp: 98.3 °F (36.8 °C)  Height: 6' (182.9 cm)  Height (inches): 72 in  Weight Method: Bed Scale  Weight: 68 kg (150 lb)  Weight (lb): 150 lb  Ideal Body Weight (IBW), Male: 178 lb  % Ideal Body Weight, Male (lb): 84.24 %  BMI (Calculated): 20.3  BMI Grade: 18.5-24.9 - normal     Estimated/Assessed Needs      Temp: 98.3 °F (36.8 °C)Axillary  Weight Used For Calorie Calculations: 68 kg (149 lb  14.6 oz)   Energy Need Method: Kcal/kg Energy Calorie Requirements (kcal): 0206-6241  Weight Used For Protein Calculations: 68 kg (149 lb 14.6 oz)  Protein Requirements: 68-81  Estimated Fluid Requirement Method: RDA Method    RDA Method (mL): 1700     Nutrition by Nursing              Last Bowel Movement: 06/24/23              Nutrition Follow-Up  RD Follow-up?: Yes

## 2023-06-27 NOTE — CONSULTS
[facility]  Adult Nutrition  Consult Note         Reason for Assessment  Reason For Assessment: consult   Nutrition Risk Screen: difficulty chewing/swallowing    Assessment and Plan  Consult received and appreciated. Consult for tube feeding recommendations. Patient was assessed by SLP this morning and was recommended to be NPO with alternate means of nutrition/hydration. His renal labs reveal elevated BUN and Cr with low eGFR. Sodium and Chloride are also elevated.     Recommend starting Suplena with a goal rate of 40mL/hour with 40mL/hour free water flush. Will monitor labs and make additional recommendations as appropriate. Keep HOB at 30 degrees. Start rate at 10mL/hour and advance slowly by 10mL q8h until goal rate is reached. Monitor for tolerance: n/v/d/c. Check residuals q12h. Hold feeding if >500. RD monitoring weight status and labs closely.     Skin Integrity  Zane Risk Assessment  Sensory Perception: 4-->no impairment  Moisture: 4-->rarely moist  Activity: 3-->walks occasionally  Mobility: 3-->slightly limited  Nutrition: 2-->probably inadequate  Friction and Shear: 3-->no apparent problem  Zane Score: 19        Malnutrition  Is patient malnourished? Yes      Nutrition Diagnosis    Malnutrition (Severe) related to chronic illness and inability to consume adequate nutrition as evidenced by nutrition assessment findings, SLP findings, and BMI 20.34.  Malnutrition Type:  Context: chronic illness  Level:      Malnutrition Characteristic Summary:  Weight Loss (Malnutrition): greater than 5% in 1 month  Energy Intake (Malnutrition): less than or equal to 75% for greater than or equal to 1 month  Subcutaneous Fat (Malnutrition): severe depletion  Muscle Mass (Malnutrition): severe depletion      Interventions/Recommendations (treatment strategy):  Recommend starting Suplena with a goal rate of 40mL/hour with 40mL/hour free water flush. Will monitor labs and make additional  recommendations as appropriate. Keep HOB at 30 degrees. Start rate at 10mL/hour and advance slowly by 10mL q8h until goal rate is reached. Monitor for tolerance: n/v/d/c. Check residuals q12h. Hold feeding if >500.    Nutrition Diagnosis Status:   Worsening     Nutrition Risk  Level of Risk/Frequency of Follow-up: high    Recent Labs   Lab 06/27/23  0229   *     Comments on Glucose: Glucose elevated. Will monitor.     Nutrition Prescription / Recommendations  Recommendation/Intervention: Recommend starting Suplena with a goal rate of 40mL/hour with 40mL/hour free water flush. Will monitor labs and make additional recommendations as appropriate. Keep HOB at 30 degrees. Start rate at 10mL/hour and advance slowly by 10mL q8h until goal rate is reached. Monitor for tolerance: n/v/d/c. Check residuals q12h. Hold feeding if >500.  Goals: weight maintenance, tolerance of diet upgrade  Nutrition Goal Status: new  Current Diet Order: NPO  Oral Nutrition Supplement: Ensure Max Protein  Chewing or Swallowing Difficulty?: Swallowing difficulty  Recommended Diet: Enteral Nutrition  Recommended Oral Supplement: No Oral Supplements  Is Nutrition Support Recommended: Yes - Suplena at 40mL/hour with 40mL/hour free water flush   TF at goal provides:   1723 kcal   43g Protein   188g CHO   92g fat   [id number] free water    Is Education Recommended: No  Monitor and Evaluation  % current Intake: NPO  % intake to meet estimated needs: Enteral Nutrition   Food and Nutrient Intake: food and beverage intake  Food and Nutrient Adminstration: diet order  Anthropometric Measurements: weight, weight change, body mass index  Biochemical Data, Medical Tests and Procedures: electrolyte and renal panel, gastrointestinal profile, glucose/endocrine profile, inflammatory profile, lipid profile  Nutrition-Focused Physical Findings: overall appearance     Current Medical Diagnosis and Past Medical History  Diagnosis: stroke/CVA  Past Medical  History:   Diagnosis Date    Disorder of thyroid, unspecified     History of renal cell carcinoma 01/2022    With mets per bone scan and MRI imaging Followed by Dr. Wiggins    Left leg pain 12/29/2022    5 months     Mixed hyperlipidemia     Stroke 11/2021    vascular dementia      Nutrition/Diet History  Spiritual, Cultural Beliefs, Evangelical Practices, Values that Affect Care: no  Food Allergies: NKFA  Factors Affecting Nutritional Intake: clear liquid diet  Lab/Procedures/Meds  Recent Labs   Lab 06/26/23  1231 06/27/23  0229   * 152*   K 4.4 4.4   * 135*   CREATININE 3.98* 3.95*   CALCIUM 9.5 8.7   ALBUMIN 2.3*  --    * 114*   *  --    *  --    Note: Na+, Cl-, BUN, Cr. Elevated. PMH ALEXANDER. Will monitor.   Last A1c: No results found for: HGBA1C  Lab Results   Component Value Date    RBC 3.79 (L) 06/27/2023    HGB 11.3 (L) 06/27/2023    HCT 37.0 (L) 06/27/2023    MCV 97.6 (H) 06/27/2023    MCH 29.8 06/27/2023    MCHC 30.5 (L) 06/27/2023     Pertinent Labs Reviewed: reviewed  Pertinent Labs Comments: Sodium: 152 (H)  Potassium: 4.4  Chloride: 114 (H)  CO2: 21  Anion Gap: 21 (H)  BUN: 135 (H)  Creatinine: 3.95 (H)  BUN/CREAT RATIO: 34 (H)  eGFR: 15 (L)  Glucose: 108 (H)  Calcium: 8.7  Pertinent Medications Reviewed: reviewed  Pertinent Medications Comments: tamsulosin, atrovastatin, clopidogrel, finastride, Znso4, levothyroxine, zosyn, heparin  Anthropometrics  Temp: 98.3 °F (36.8 °C)  Height: 6' (182.9 cm)  Height (inches): 72 in  Weight Method: Bed Scale  Weight: 68 kg (150 lb)  Weight (lb): 150 lb  Ideal Body Weight (IBW), Male: 178 lb  % Ideal Body Weight, Male (lb): 84.24 %  BMI (Calculated): 20.3  BMI Grade: 18.5-24.9 - normal     Estimated/Assessed Needs  RMR (L[place]-St. Jeor Equation): 1458.53     Temp: 98.3 °F (36.8 °C)Axillary  Weight Used For Calorie Calculations: 68 kg (149 lb 14.6 oz)   Energy Need Method: Kcal/kg Energy Calorie Requirements (kcal): 0673-5707  Weight Used  For Protein Calculations: 68 kg (149 lb 14.6 oz)  Protein Requirements: 68-81  Estimated Fluid Requirement Method: RDA Method    RDA Method (mL): 1700     Nutrition by Nursing     Intake (%): 25%     Diet/Feeding Tolerance: poor  Last Bowel Movement: 06/24/23                Nutrition Follow-Up  RD Follow-up?: Yes      Lis Burrell MS, RD, LD  Available through Secure Chat

## 2023-06-27 NOTE — ASSESSMENT & PLAN NOTE
Nutrition consulted. Most recent weight and BMI monitored-     Measurements:  Wt Readings from Last 1 Encounters:   06/26/23 68 kg (150 lb)   Body mass index is 20.34 kg/m².    Patient has been screened and assessed by RD     Malnutrition Type:  Context: chronic illness  Level:      Malnutrition Characteristic Summary:  Weight Loss (Malnutrition): greater than 5% in 1 month  Energy Intake (Malnutrition): less than or equal to 75% for greater than or equal to 1 month  Subcutaneous Fat (Malnutrition): severe depletion  Muscle Mass (Malnutrition): severe depletion      Interventions/Recommendations (treatment strategy):  Recommend starting Suplena with a goal rate of 40mL/hour with 40mL/hour free water flush. Will monitor labs and make additional recommendations as appropriate. Keep HOB at 30 degrees. Start rate at 10mL/hour and advance slowly by 10mL q8h until goal rate is reached. Monitor for tolerance: n/v/d/c. Check residuals q12h. Hold feeding if >500.

## 2023-06-27 NOTE — SUBJECTIVE & OBJECTIVE
Interval History: No acute events O/N. However, patient hasn't really been able to eat and swallow the clear liquid diet. Speech eval recommends NPO due to inability to swallow, alternate route for feeding, and GI evaluation. GI consulted for potential EGD with dilation vs PEG placement. Discussed with patient for NGT vs PEG placement, he agrees to having NGT. NGT placed and diet consulted again for feeding. Will feed and give medications through NGT. WBC and Cr mildly improving. HR improving. Diet consult says patient met criteria for severe protein-calorie malnutrition. Repeat lactic acid, UA, and bladder scan pending. Patient reports he's urinating fine. CXR negative for acute findings. After 2L NS boluses last night, had half NS for hours but switched to D5W@115cc/hr due to Na and Cl at 152 and 114. Monitoring CBC and BMP.    Review of Systems   Constitutional:  Positive for appetite change and unexpected weight change (weight loss). Negative for chills, diaphoresis and fever.   HENT:  Positive for congestion, rhinorrhea, sore throat and trouble swallowing.         Whispers at baseline s/p CVA   Eyes:  Negative for visual disturbance.   Respiratory:  Negative for shortness of breath.    Cardiovascular:  Negative for chest pain.   Gastrointestinal:  Positive for nausea and vomiting. Negative for abdominal pain.   Genitourinary:  Negative for dysuria and hematuria.   Musculoskeletal:  Negative for back pain.   Skin:  Negative for wound.   Neurological:  Positive for weakness.   Psychiatric/Behavioral:  Negative for sleep disturbance.    Objective:     Vital Signs (Most Recent):  Temp: 98.3 °F (36.8 °C) (06/27/23 1200)  Pulse: 97 (06/27/23 1200)  Resp: 19 (06/27/23 1200)  BP: 131/75 (06/27/23 1200)  SpO2: 96 % (06/27/23 1200) Vital Signs (24h Range):  Temp:  [96.5 °F (35.8 °C)-98.3 °F (36.8 °C)] 98.3 °F (36.8 °C)  Pulse:  [] 97  Resp:  [18-30] 19  SpO2:  [94 %-97 %] 96 %  BP: (131-145)/(75-90) 131/75      Weight: 68 kg (150 lb)  Body mass index is 20.34 kg/m².    Intake/Output Summary (Last 24 hours) at 6/27/2023 1433  Last data filed at 6/27/2023 0745  Gross per 24 hour   Intake 175 ml   Output --   Net 175 ml         Physical Exam  Vitals and nursing note reviewed.   Constitutional:       General: He is not in acute distress.     Appearance: He is ill-appearing. He is not toxic-appearing or diaphoretic.      Comments: Regurgitating food during exam   HENT:      Head: Normocephalic and atraumatic.      Right Ear: External ear normal.      Left Ear: External ear normal.      Nose: Nose normal.      Mouth/Throat:      Mouth: Mucous membranes are dry.      Pharynx: Oropharynx is clear. No oropharyngeal exudate.   Eyes:      General: No scleral icterus.     Extraocular Movements: Extraocular movements intact.      Pupils: Pupils are equal, round, and reactive to light.   Cardiovascular:      Rate and Rhythm: Normal rate and regular rhythm.      Pulses: Normal pulses.      Heart sounds: Normal heart sounds. No murmur heard.  Pulmonary:      Effort: Pulmonary effort is normal. No respiratory distress.      Breath sounds: Rales present.   Abdominal:      General: Abdomen is flat. Bowel sounds are normal. There is no distension.      Palpations: Abdomen is soft.      Tenderness: There is no abdominal tenderness.   Musculoskeletal:         General: No deformity.      Cervical back: Neck supple.   Skin:     General: Skin is warm and dry.      Coloration: Skin is not jaundiced.   Neurological:      Mental Status: He is alert and oriented to person, place, and time.   Psychiatric:         Behavior: Behavior normal.           Significant Labs: All pertinent labs within the past 24 hours have been reviewed.    Significant Imaging: I have reviewed all pertinent imaging results/findings within the past 24 hours.

## 2023-06-28 ENCOUNTER — ANESTHESIA (OUTPATIENT)
Dept: GASTROENTEROLOGY | Facility: HOSPITAL | Age: 73
DRG: 682 | End: 2023-06-28
Payer: MEDICARE

## 2023-06-28 ENCOUNTER — ANESTHESIA EVENT (OUTPATIENT)
Dept: GASTROENTEROLOGY | Facility: HOSPITAL | Age: 73
DRG: 682 | End: 2023-06-28
Payer: MEDICARE

## 2023-06-28 VITALS
RESPIRATION RATE: 27 BRPM | SYSTOLIC BLOOD PRESSURE: 114 MMHG | BODY MASS INDEX: 20.32 KG/M2 | HEIGHT: 72 IN | OXYGEN SATURATION: 99 % | DIASTOLIC BLOOD PRESSURE: 77 MMHG | HEART RATE: 103 BPM | WEIGHT: 150 LBS | TEMPERATURE: 98 F

## 2023-06-28 PROBLEM — K12.30 MUCOSITIS AFTER THERAPY: Status: ACTIVE | Noted: 2023-06-28

## 2023-06-28 PROBLEM — K29.70 GASTRITIS: Status: ACTIVE | Noted: 2023-06-28

## 2023-06-28 PROBLEM — K21.00 REFLUX ESOPHAGITIS: Status: ACTIVE | Noted: 2023-06-28

## 2023-06-28 LAB
ANION GAP SERPL CALCULATED.3IONS-SCNC: 20 MMOL/L (ref 7–16)
ANISOCYTOSIS BLD QL SMEAR: ABNORMAL
BASOPHILS # BLD AUTO: 0.05 K/UL (ref 0–0.2)
BASOPHILS NFR BLD AUTO: 0.2 % (ref 0–1)
BUN SERPL-MCNC: 129 MG/DL (ref 7–18)
BUN/CREAT SERPL: 32 (ref 6–20)
CALCIUM SERPL-MCNC: 9 MG/DL (ref 8.5–10.1)
CHLORIDE SERPL-SCNC: 114 MMOL/L (ref 98–107)
CO2 SERPL-SCNC: 22 MMOL/L (ref 21–32)
CREAT SERPL-MCNC: 4.03 MG/DL (ref 0.7–1.3)
DIFFERENTIAL METHOD BLD: ABNORMAL
EGFR (NO RACE VARIABLE) (RUSH/TITUS): 15 ML/MIN/1.73M2
EOSINOPHIL # BLD AUTO: 0.02 K/UL (ref 0–0.5)
EOSINOPHIL NFR BLD AUTO: 0.1 % (ref 1–4)
ERYTHROCYTE [DISTWIDTH] IN BLOOD BY AUTOMATED COUNT: 15.9 % (ref 11.5–14.5)
GLUCOSE SERPL-MCNC: 139 MG/DL (ref 74–106)
HCT VFR BLD AUTO: 41.3 % (ref 40–54)
HGB BLD-MCNC: 12.3 G/DL (ref 13.5–18)
IMM GRANULOCYTES # BLD AUTO: 0.32 K/UL (ref 0–0.04)
IMM GRANULOCYTES NFR BLD: 1.1 % (ref 0–0.4)
LACTATE SERPL-SCNC: 2.5 MMOL/L (ref 0.4–2)
LACTATE SERPL-SCNC: 2.7 MMOL/L (ref 0.4–2)
LACTATE SERPL-SCNC: 3.5 MMOL/L (ref 0.4–2)
LYMPHOCYTES # BLD AUTO: 0.89 K/UL (ref 1–4.8)
LYMPHOCYTES NFR BLD AUTO: 3 % (ref 27–41)
LYMPHOCYTES NFR BLD MANUAL: 1 % (ref 27–41)
MCH RBC QN AUTO: 29.8 PG (ref 27–31)
MCHC RBC AUTO-ENTMCNC: 29.8 G/DL (ref 32–36)
MCV RBC AUTO: 100 FL (ref 80–96)
MONOCYTES # BLD AUTO: 1.84 K/UL (ref 0–0.8)
MONOCYTES NFR BLD AUTO: 6.1 % (ref 2–6)
MONOCYTES NFR BLD MANUAL: 14 % (ref 2–6)
MPC BLD CALC-MCNC: 10.9 FL (ref 9.4–12.4)
NEUTROPHILS # BLD AUTO: 26.83 K/UL (ref 1.8–7.7)
NEUTROPHILS NFR BLD AUTO: 89.5 % (ref 53–65)
NEUTS SEG NFR BLD MANUAL: 85 % (ref 50–62)
NRBC # BLD AUTO: 0 X10E3/UL
NRBC, AUTO (.00): 0 %
PLATELET # BLD AUTO: 338 K/UL (ref 150–400)
PLATELET MORPHOLOGY: NORMAL
POTASSIUM SERPL-SCNC: 4 MMOL/L (ref 3.5–5.1)
RBC # BLD AUTO: 4.13 M/UL (ref 4.6–6.2)
SODIUM SERPL-SCNC: 152 MMOL/L (ref 136–145)
WBC # BLD AUTO: 29.95 K/UL (ref 4.5–11)

## 2023-06-28 PROCEDURE — 99239 HOSP IP/OBS DSCHRG MGMT >30: CPT | Mod: GC,,, | Performed by: FAMILY MEDICINE

## 2023-06-28 PROCEDURE — 88305 SURGICAL PATHOLOGY: ICD-10-PCS | Mod: 26,,, | Performed by: PATHOLOGY

## 2023-06-28 PROCEDURE — 25000003 PHARM REV CODE 250

## 2023-06-28 PROCEDURE — A4217 STERILE WATER/SALINE, 500 ML: HCPCS | Performed by: FAMILY MEDICINE

## 2023-06-28 PROCEDURE — 43239 EGD BIOPSY SINGLE/MULTIPLE: CPT | Mod: ,,, | Performed by: INTERNAL MEDICINE

## 2023-06-28 PROCEDURE — 25000003 PHARM REV CODE 250: Performed by: FAMILY MEDICINE

## 2023-06-28 PROCEDURE — 63600175 PHARM REV CODE 636 W HCPCS

## 2023-06-28 PROCEDURE — 83605 ASSAY OF LACTIC ACID: CPT

## 2023-06-28 PROCEDURE — 88305 TISSUE EXAM BY PATHOLOGIST: CPT | Mod: 26,,, | Performed by: PATHOLOGY

## 2023-06-28 PROCEDURE — 43239 EGD BIOPSY SINGLE/MULTIPLE: CPT | Performed by: INTERNAL MEDICINE

## 2023-06-28 PROCEDURE — 97161 PT EVAL LOW COMPLEX 20 MIN: CPT

## 2023-06-28 PROCEDURE — 88305 TISSUE EXAM BY PATHOLOGIST: CPT | Mod: TC,SUR | Performed by: INTERNAL MEDICINE

## 2023-06-28 PROCEDURE — D9220A PRA ANESTHESIA: ICD-10-PCS | Mod: ,,, | Performed by: NURSE ANESTHETIST, CERTIFIED REGISTERED

## 2023-06-28 PROCEDURE — 97166 OT EVAL MOD COMPLEX 45 MIN: CPT

## 2023-06-28 PROCEDURE — 99239 PR HOSPITAL DISCHARGE DAY,>30 MIN: ICD-10-PCS | Mod: GC,,, | Performed by: FAMILY MEDICINE

## 2023-06-28 PROCEDURE — 88342 SURGICAL PATHOLOGY: ICD-10-PCS | Mod: 26,,, | Performed by: PATHOLOGY

## 2023-06-28 PROCEDURE — D9220A PRA ANESTHESIA: Mod: ,,, | Performed by: NURSE ANESTHETIST, CERTIFIED REGISTERED

## 2023-06-28 PROCEDURE — 85025 COMPLETE CBC W/AUTO DIFF WBC: CPT

## 2023-06-28 PROCEDURE — C9113 INJ PANTOPRAZOLE SODIUM, VIA: HCPCS

## 2023-06-28 PROCEDURE — 63600175 PHARM REV CODE 636 W HCPCS: Performed by: NURSE ANESTHETIST, CERTIFIED REGISTERED

## 2023-06-28 PROCEDURE — 25000003 PHARM REV CODE 250: Performed by: NURSE ANESTHETIST, CERTIFIED REGISTERED

## 2023-06-28 PROCEDURE — 51798 US URINE CAPACITY MEASURE: CPT

## 2023-06-28 PROCEDURE — 43239 PR EGD, FLEX, W/BIOPSY, SGL/MULTI: ICD-10-PCS | Mod: ,,, | Performed by: INTERNAL MEDICINE

## 2023-06-28 PROCEDURE — 88342 IMHCHEM/IMCYTCHM 1ST ANTB: CPT | Mod: 26,,, | Performed by: PATHOLOGY

## 2023-06-28 PROCEDURE — 80048 BASIC METABOLIC PNL TOTAL CA: CPT

## 2023-06-28 PROCEDURE — 63600175 PHARM REV CODE 636 W HCPCS: Performed by: FAMILY MEDICINE

## 2023-06-28 RX ORDER — PANTOPRAZOLE SODIUM 40 MG/1
40 TABLET, DELAYED RELEASE ORAL DAILY
Qty: 30 TABLET | Refills: 1 | Status: SHIPPED | OUTPATIENT
Start: 2023-06-28 | End: 2023-07-26

## 2023-06-28 RX ORDER — PROPOFOL 10 MG/ML
VIAL (ML) INTRAVENOUS
Status: DISCONTINUED | OUTPATIENT
Start: 2023-06-28 | End: 2023-06-28

## 2023-06-28 RX ORDER — DEXAMETHASONE 0.5 MG/5ML
1 ELIXIR ORAL 4 TIMES DAILY
Status: DISCONTINUED | OUTPATIENT
Start: 2023-06-28 | End: 2023-06-28 | Stop reason: HOSPADM

## 2023-06-28 RX ORDER — FLUCONAZOLE 2 MG/ML
200 INJECTION, SOLUTION INTRAVENOUS
Status: DISCONTINUED | OUTPATIENT
Start: 2023-06-28 | End: 2023-06-28 | Stop reason: HOSPADM

## 2023-06-28 RX ORDER — FLUCONAZOLE 200 MG/1
200 TABLET ORAL DAILY
Qty: 21 TABLET | Refills: 0 | Status: SHIPPED | OUTPATIENT
Start: 2023-06-28 | End: 2023-07-19

## 2023-06-28 RX ORDER — DEXAMETHASONE 0.5 MG/5ML
1 ELIXIR ORAL 4 TIMES DAILY
Qty: 400 ML | Refills: 1 | Status: SHIPPED | OUTPATIENT
Start: 2023-06-28 | End: 2023-07-18

## 2023-06-28 RX ORDER — LIDOCAINE HYDROCHLORIDE 20 MG/ML
INJECTION, SOLUTION EPIDURAL; INFILTRATION; INTRACAUDAL; PERINEURAL
Status: DISCONTINUED | OUTPATIENT
Start: 2023-06-28 | End: 2023-06-28

## 2023-06-28 RX ORDER — SODIUM CHLORIDE 0.45 G/100ML
SOLUTION INTRAVENOUS CONTINUOUS
Status: DISCONTINUED | OUTPATIENT
Start: 2023-06-28 | End: 2023-06-28 | Stop reason: HOSPADM

## 2023-06-28 RX ORDER — GLYCOPYRROLATE 0.2 MG/ML
INJECTION INTRAMUSCULAR; INTRAVENOUS
Status: DISCONTINUED | OUTPATIENT
Start: 2023-06-28 | End: 2023-06-28

## 2023-06-28 RX ADMIN — HEPARIN SODIUM 5000 UNITS: 5000 INJECTION, SOLUTION INTRAVENOUS; SUBCUTANEOUS at 01:06

## 2023-06-28 RX ADMIN — PROPOFOL 50 MG: 10 INJECTION, EMULSION INTRAVENOUS at 11:06

## 2023-06-28 RX ADMIN — VASOPRESSIN: 20 INJECTION, SOLUTION INTRAVENOUS at 08:06

## 2023-06-28 RX ADMIN — DEXTROSE MONOHYDRATE: 50 INJECTION, SOLUTION INTRAVENOUS at 04:06

## 2023-06-28 RX ADMIN — GLYCOPYRROLATE 0.2 MG: 0.2 INJECTION INTRAMUSCULAR; INTRAVENOUS at 11:06

## 2023-06-28 RX ADMIN — DEXAMETHASONE 1 MG: 0.5 ELIXIR ORAL at 12:06

## 2023-06-28 RX ADMIN — FLUCONAZOLE 200 MG: 2 INJECTION, SOLUTION INTRAVENOUS at 04:06

## 2023-06-28 RX ADMIN — PIPERACILLIN AND TAZOBACTAM 4.5 G: 4; .5 INJECTION, POWDER, FOR SOLUTION INTRAVENOUS; PARENTERAL at 01:06

## 2023-06-28 RX ADMIN — PIPERACILLIN AND TAZOBACTAM 4.5 G: 4; .5 INJECTION, POWDER, FOR SOLUTION INTRAVENOUS; PARENTERAL at 03:06

## 2023-06-28 RX ADMIN — SODIUM CHLORIDE: 9 INJECTION, SOLUTION INTRAVENOUS at 10:06

## 2023-06-28 RX ADMIN — PANTOPRAZOLE SODIUM 40 MG: 40 INJECTION, POWDER, FOR SOLUTION INTRAVENOUS at 08:06

## 2023-06-28 RX ADMIN — LIDOCAINE HYDROCHLORIDE 50 MG: 20 INJECTION, SOLUTION INTRAVENOUS at 11:06

## 2023-06-28 NOTE — ASSESSMENT & PLAN NOTE
S/p TURP  Home finasteride 5 qd, Tamsulosin 0.4 qd    Consider OP Urology f/u - need to schedule at PCP's appointment f/u   Prednisone Counseling:  I discussed with the patient the risks of prolonged use of prednisone including but not limited to weight gain, insomnia, osteoporosis, mood changes, diabetes, susceptibility to infection, glaucoma and high blood pressure.  In cases where prednisone use is prolonged, patients should be monitored with blood pressure checks, serum glucose levels and an eye exam.  Additionally, the patient may need to be placed on GI prophylaxis, PCP prophylaxis, and calcium and vitamin D supplementation and/or a bisphosphonate.  The patient verbalized understanding of the proper use and the possible adverse effects of prednisone.  All of the patient's questions and concerns were addressed.

## 2023-06-28 NOTE — ASSESSMENT & PLAN NOTE
Immunotherapy for RCC on 6/12/23  Adamantly refused NGT (it came off overnight on 6/27/23) and PEG placement    GI consulted (appreciate assistance) and recommended Dex swish and spit along with PPI.   EGD done on 6/28/23 showed grade A reflux esophagitis, mild gastritis, and likely mucositis from immunotherapy. No stricture, mass, bleeds were found. Biopsies taken.   Oncology Dr. Wiggins contacted and was surprised of patient's symptoms s/p immunotherapy since it's rare. He recommended Diflucan IV and fluid replacement. Symptoms could also be due to rapid decline of patient's RCC and prognosis is likely poor. We have discussed risks of aspiration on PO diet but he still refused NGT and PEG and expressed understanding.   Dex swish and spit QID, Diflucan 200 QD, and Protonix 40 QD started. Stopped antibiotics and prednisone. Home medications restarted otherwise. He received IV hypotonic saline, IV diflucan, and IV Zosyn before discharge. He was stable upon discharge and will be discharged home. He will followup with PCP in 1 week, Oncology on 7/10/23, and Nephrology in about 1 week.

## 2023-06-28 NOTE — PT/OT/SLP EVAL
Occupational Therapy   Evaluation    Name: Kerwin Wilson  MRN: [id number]  Admitting Diagnosis: Mucositis after therapy  Recent Surgery: * No surgery found *      Recommendations:     Discharge Recommendations: home with home health  Discharge Equipment Recommendations:   (to be determined)  Barriers to discharge:  None    Assessment:     Kerwin Wilson is a 73 y.o. male with a medical diagnosis of Mucositis after therapy.  He presents with no complaints.Pt agreed to OT evaluation Performance deficits affecting function: weakness, impaired endurance, impaired self care skills, impaired functional mobility, gait instability, impaired balance.      Rehab Prognosis: Good; patient would benefit from acute skilled OT services to address these deficits and reach maximum level of function.       Plan:     Patient to be seen 5 x/week to address the above listed problems via self-care/home management, therapeutic activities, therapeutic exercises  Plan of Care Expires:    Plan of Care Reviewed with: patient, sibling    Subjective     Chief Complaint: ALEXANDER, Mucositis after therapy  Patient/Family Comments/goals: To return home    Occupational Profile:  Living Environment: Pt lives with sister in 1 story home with no steps to enter  Previous level of function: Pt reports being (I) with self care prior  Roles and Routines: I with daily activities priors, but reports a decline in status due to immunotherapy.  Equipment Used at Home: walker, rolling, cane, straight ([facility] of [facility] over toilet)  Assistance upon Discharge: Sister    Pain/Comfort:  Pain Rating 1: 0/10  Pain Rating Post-Intervention 1: 0/10    Patients cultural, spiritual, Orthodoxy conflicts given the current situation: no    Objective:     Communicated with: KATHY Link prior to session.  Patient found sitting edge of bed with peripheral IV, telemetry upon OT entry to room.    General Precautions: Standard, fall  Orthopedic Precautions: N/A  Braces: N/A  Respiratory  Status: Room air    Occupational Performance:    Bed Mobility:      Mod I sit/supineSBA supine/sit    Functional Mobility/Transfers:  Patient completed Bed <> Chair Transfer using Step Transfer technique with stand by assistance and verbal cueing with rolling walker  Functional Mobility: SBA with RW with verbal cueing.    Activities of Daily Living:  Upper Body Dressing: contact guard assistance donning pullover shirt  Lower Body Dressing: dependence donning socks. Pt attempted.    Cognitive/Visual Perceptual:  Cognitive/Psychosocial Skills:     -       Oriented to: Person   -       Follows Commands/attention:Follows one-step commands  -       Communication: expressive aphasia and but is able to make needs known  Visual/Perceptual:      -wears glasses, hearing WFL .    Physical Exam:  Balance:    -       I with EOB sitting, CGA sit/stand and SBA with mobility  Skin integrity: Visible skin intact  Edema:  None noted  Sensation:    -       Intact  Motor Planning:    -       WFL  Dominant hand:    -       Right  Upper Extremity Range of Motion:     -       Right Upper Extremity: WFL  -       Left Upper Extremity: WFL  Upper Extremity Strength:    -       Right Upper Extremity: WFL  -       Left Upper Extremity: WFL   Strength:    -       Right Upper Extremity: WFL  -       Left Upper Extremity: WFL    AMPAC 6 Click ADL:  AMPAC Total Score: 16    Treatment & Education:  OT evaluation completed. See eval for details.  Pt educated on OT role/POC.   Importance of OOB activity with staff assistance.  Importance of sitting up in the chair throughout the day as tolerated, especially for meals   Safety during functional t/f and mobility with use of RW  Importance of assisting with self-care activities   All questions/concerns answered within OT scope of practice     Patient left up in chair with all lines intact and call button in reach    GOALS:   Multidisciplinary Problems       Occupational Therapy Goals           Problem: Occupational Therapy    Goal Priority Disciplines Outcome Interventions   Occupational Therapy Goal     OT, PT/OT Ongoing, Progressing    Description: STG:  Pt will perform grooming with setup  Pt will bathe with with setup  Pt will perform UE dressing with (I)  Pt will perform LE dressing with (I)  Pt will transfer bed/chair/bsc with Mod I  Pt will perform standing task x 2 min with Mod I  Pt will tolerate 15 minutes of tx without fatigue      LT.Restore to max I with self care and mobility.                          History:     Past Medical History:   Diagnosis Date    Disorder of thyroid, unspecified     History of renal cell carcinoma 2022    With mets per bone scan and MRI imaging Followed by Dr. Wiggins    Left leg pain 2022    5 months     Mixed hyperlipidemia     Stroke 2021    vascular dementia          Past Surgical History:   Procedure Laterality Date    BLADDER SURGERY      portion of bladder removed    KIDNEY SURGERY Right     kidney removal    TONSILLECTOMY      TRANSURETHRAL RESECTION OF PROSTATE  2023       Time Tracking:     OT Date of Treatment: 23  OT Start Time: 852  OT Stop Time: 916  OT Total Time (min): 24 min    Billable Minutes:Evaluation 24    2023

## 2023-06-28 NOTE — ANESTHESIA PREPROCEDURE EVALUATION
06/28/2023  Kerwin Wilson is a 73 y.o., male.      Pre-op Assessment    I have reviewed the Patient Summary Reports.     I have reviewed the Nursing Notes. I have reviewed the NPO Status.   I have reviewed the Medications.     Review of Systems  Anesthesia Hx:  No problems with previous Anesthesia  Denies Family Hx of Anesthesia complications.   Denies Personal Hx of Anesthesia complications.   Social:  Former Smoker    Hematology/Oncology:  Hematology Normal       -- Cancer in past history:  Other (see Oncology comments)   EENT/Dental:  EENT/Dental Normal  Otitis media: Renal.   Cardiovascular:   hyperlipidemia ECG has been reviewed.    Pulmonary:  Pulmonary Normal    Renal/:   Chronic Renal Disease, ARF BPH    Musculoskeletal:  Musculoskeletal Normal    Neurological:   CVA    Endocrine:   Hypothyroidism    Dermatological:  Skin Normal    Psych:   Psychiatric History (Dementia)          Physical Exam  General: Well nourished    Airway:  Mallampati: II   Mouth Opening: Normal  TM Distance: Normal  Tongue: Normal  Neck ROM: Normal ROM    Dental:  Intact        Anesthesia Plan  Type of Anesthesia, risks & benefits discussed:    Anesthesia Type: Gen Natural Airway  Intra-op Monitoring Plan: Standard ASA Monitors  Post Op Pain Control Plan: multimodal analgesia  Induction:  IV  Informed Consent: Informed consent signed with the Patient and all parties understand the risks and agree with anesthesia plan.  All questions answered. Patient consented to blood products? Yes  ASA Score: 3  Day of Surgery Review of History & Physical: H&P Update referred to the surgeon/provider.I have interviewed and examined the patient. I have reviewed the patient's H&P dated: There are no significant changes.     Ready For Surgery From Anesthesia Perspective.     .

## 2023-06-28 NOTE — HOSPITAL COURSE
Patient is a 72yo male with a PMH of metastasized RCC (bone, liver) followed by Dr. Wiggins, dementia, CVA with residual expressive aphasia, HLD who presented to [facility] ED with vomiting, weakness, decrease PO intake, appetite, difficulty swallowing, dehydration s/p immunotherapy for RCC on and around 6/12/23. Patient was seen at Dr. Leblanc's clinic for his dysphagia. Patient was still eating fine and doing ADLs and hobbies such as boating until 1-2 weeks ago. Symptoms started and worsened since around 6/12 after the immunotherapy. WBC and Cr high upon admission. Patient received IV NS boluses 2L and maintenance fluids. He received IV Zosyn (6/26/23-6/28/23) for 3 days. CXR negative for acute findings. EKG NSR. Lactic acid 2.2, 2.4, 3.5, 2.7, 2.5. Speech therapist saw patient and he failed bedside swallow eval on 6/27/23. Speech recommended NPO diet and alternate route of feeding along with GI evaluation. NGT placed and small portion of tube feeds given. GI consulted and recommended Dex swish and spit along with PPI. EGD done on 6/28/23 showed grade A reflux esophagitis, mild gastritis, and likely mucositis from immunotherapy. No stricture, mass, bleeds were found. Biopsies taken. GI recommended speaking with Oncology. Dr. Wiggins was surprised of patient's symptoms s/p immunotherapy. He recommended Diflucan IV and fluid replacement. Symptoms could also be due to rapid decline of patient's RCC and prognosis is likely poor. Patient refused adamantly NGT (it came off overnight on 6/27/23) and PEG tube placement. We have discussed risks of aspiration on PO diet but he still refused NGT and PEG and expressed understanding. Patient has maximized benefits from this admission and will be discharged home. Dex swish and spit QID, Diflucan 200 QD, and Protonix 40 QD started. Stopped antibiotics and prednisone. Home medications restarted otherwise. He received IV hypotonic saline, IV diflucan, and IV Zosyn before discharge. He was  stable upon discharge and will be discharged home. He will followup with PCP in 1 week, Oncology on 7/10/23, and Nephrology in about 1 week.

## 2023-06-28 NOTE — ASSESSMENT & PLAN NOTE
patient hasn't really been able to eat and swallow the clear liquid diet.   Speech eval recommends NPO due to inability to swallow, alternate route for feeding, and GI evaluation.   GI consulted for potential EGD with dilation vs PEG placement, appreciate assistance  Discussed with patient for NGT vs PEG placement, he agrees to having NGT.  NGT pending and diet consulted for feeding. Will feed and give medications through NGT.     See mucositis

## 2023-06-28 NOTE — PT/OT/SLP EVAL
Physical Therapy Evaluation and Treatment    Patient Name: Kerwin Wilson   MRN: [id number]  Recent Surgery: * No surgery found *      Recommendations:     Discharge Recommendations: home with home health, home health PT   Discharge Equipment Recommendations: none   Barriers to discharge: Increased level of assist and Ongoing medical treatment    Assessment:     Kerwin Wilson is a 73 y.o. male admitted with a medical diagnosis of ALEXANDER (acute kidney injury). Decreased functional mobility post immunotherapy treatment. He presents with the following impairments/functional limitations: weakness, impaired endurance, impaired self care skills, impaired functional mobility, gait instability, impaired balance.     Patient participated well with PT evaluation but fatigued easily. Patient plans to return home with family assist at d/c and will benefit from home health PT follow up.    Rehab Prognosis: Good; patient would benefit from acute PT services to address these deficits and reach maximum level of function.    Plan:     During this hospitalization, patient to be seen 5 x/week to address the above listed problems via gait training, therapeutic activities, therapeutic exercises    Plan of Care Expires: 07/28/23    Subjective     Chief Complaint: generalized weakness/deconditioning, ALEXANDER, poor oral intake  Patient Comments/Goals: Patient's sister confirms patient was independent with all cares/mobility using SPC at times prior to immunotherapy treatment.  Pain/Comfort:  Pain Rating 1: 0/10  Pain Rating Post-Intervention 1: 0/10    Social History:  Living Environment: Patient lives with their sister in a single story home with number of outside stair(s): 2  Prior Level of Function: Prior to admission, patient was modified independent with ADLs using straight cane at times for mobility. Started using RW as he felt weaker in the week prior to admit. Fall at home  Equipment Used at Home: cane, straight, walker, rolling  DME owned (not  currently used): none  Assistance Upon Discharge: family and home debra    Objective:     Communicated with Albino Torrez RN prior to session. Patient found sitting edge of bed with peripheral IV, telemetry (chemo port) upon PT entry to room.    General Precautions: Standard, fall   Orthopedic Precautions: N/A   Braces: N/A    Respiratory Status: Room air; increased rest rate with minimal exertion but O2 sats >= 97% on room air    Exams:  Cognition: Patient is oriented to Person, Place, Time, Situation  RLE ROM: WFL  RLE Strength: Deficits: 4/5  LLE ROM: WFL  LLE Strength: Deficits: 4/5  Sensation:    -       Intact    Functional Mobility:  Gait belt applied - Yes  Bed Mobility  Supine to Sit: stand by assistance and verbal cues to increase efficiency for LE management and trunk management  Sit to Supine: modified independence  Transfers  Sit to Stand: contact guard assistance with rolling walker and with cues for hand placement and foot placement  Bed to Chair: contact guard assistance with rolling walker and with cues for safety using Step Transfer  Gait  Patient ambulated 20' with rolling walker and contact guard assistance. Patient demonstrates occasional unsteady gait, decreased step length, decreased lydia, and easy fatigue . All lines remained intact throughout ambulation trail.  Balance  Sitting: stand by assistance- LOB posteriorly with challenges  Standing: stand by assistance using RW    Therapeutic Activities and Exercises:   Patient educated on role of acute care PT and PT POC, safety while in hospital including calling nurse for mobility, and call light usage  Patient educated about importance of OOB mobility and remaining up in chair most of the day.  Use of RW for increased stability    AM-PAC 6 CLICK MOBILITY  Total Score:20    Patient left up in chair with all lines intact, call button in reach, RN notified, and sister present.    GOALS:   Multidisciplinary Problems       Physical Therapy Goals           Problem: Physical Therapy    Goal Priority Disciplines Outcome Goal Variances Interventions   Physical Therapy Goal     PT, PT/OT Ongoing, Progressing     Description: Short Term Goals to be met by: 2023    Patient will increase functional independence with mobility by performin. Supine to sit with independently  2. Sit to stand transfer with independently using Rolling walker  3. Bed to chair transfer with independently using Rolling walker  4. Gait  x 100 feet with independently using Rolling walker  5. Lower extremity exercise program x30 reps per handout, with assistance as needed    Long Term Goals to be met by: 2023    Pt will regain full independent functional mobility with lowest level of assistive device to return to home situation and prior activities of daily living.                        History:     Past Medical History:   Diagnosis Date    Disorder of thyroid, unspecified     History of renal cell carcinoma 2022    With mets per bone scan and MRI imaging Followed by Dr. Wiggins    Left leg pain 2022    5 months     Mixed hyperlipidemia     Stroke 2021    vascular dementia        Past Surgical History:   Procedure Laterality Date    BLADDER SURGERY      portion of bladder removed    KIDNEY SURGERY Right     kidney removal    TONSILLECTOMY      TRANSURETHRAL RESECTION OF PROSTATE  2023       Time Tracking:     PT Received On: 23  PT Start Time: 854  PT Stop Time: 913  PT Total Time (min): 19 min     Billable Minutes: Evaluation Low complexity    2023

## 2023-06-28 NOTE — PLAN OF CARE
Problem: Occupational Therapy  Goal: Occupational Therapy Goal  Description: STG:  Pt will perform grooming with setup  Pt will bathe with with setup  Pt will perform UE dressing with (I)  Pt will perform LE dressing with (I)  Pt will transfer bed/chair/bsc with Mod I  Pt will perform standing task x 2 min with Mod I  Pt will tolerate 15 minutes of tx without fatigue      LT.Restore to max I with self care and mobility.     Outcome: Ongoing, Progressing

## 2023-06-28 NOTE — ASSESSMENT & PLAN NOTE
Patient with acute kidney injury/acute renal failure likely due to pre-renal azotemia due to dehydration ALEXANDER is currently worsening. Baseline creatinine 1.48 on 12/29/22. - Labs reviewed- Renal function/electrolytes with Estimated Creatinine Clearance: 15.5 mL/min (A) (based on SCr of 4.03 mg/dL (H)). according to latest data. Monitor urine output and serial BMP and adjust therapy as needed. Avoid nephrotoxins and renally dose meds for GFR listed above.    Baseline Cr 1.48 and GFR 50 on 12/29/22  BUN/Cr = 32 2/2 dehydration and decreased PO intake s/p immunotherapy 1 week ago for RCC  Telemetry  IV half NS@100cc/hr for 2 days (maintenace rate)  Avoid nephrotoxic drugs  Renal dose medications  Trend BMP  Consider UA, urine electrolytes, urine protein/Cr if no improvement    6/27: Cr 3.95 mildly improving. UA and bladder scan pending. Patient reports he's urinating fine. After 2L NS boluses last night, had half NS for hours but switched to D5W@115cc/hr due to Na and Cl at 152 and 114. Monitor BMP and UOP.   6/28: see mucositis. F/u Nephro OP

## 2023-06-28 NOTE — NURSING
"Pt removed NG tube from nare.  Stated that he was "tired of it."  I asked pt if he was refusing treatment, pt shook head yes.  Asked again to verify, and he pt shook head yes once again.  Alerted Dr. Resendiz and asked if tube needed replacement, stated to not replace and alerted residents for f/u.  Care ongoing.   "

## 2023-06-28 NOTE — DISCHARGE SUMMARY
[facility] Short Stay Unit  Hospital Medicine  Discharge Summary      Patient Name: Kerwin Wilson  MRN: [id number]  JACKIE: [id number]  Patient Class: IP- Inpatient  Admission Date: 6/26/2023  Hospital Length of Stay: 2 days  Discharge Date and Time:  06/28/2023 5:06 PM  Attending Physician: Leidy Pate DO   Discharging Provider: Atif Valdez DO  Primary Care Provider: Chito Leblanc DO    Primary Care Team: Networked reference to record PCT     HPI:   Patient is a 72yo male with a PMH of RCC diagnosed in 2022 with mets per bone scan and MRI, s/p nephrectomy on the right and partial bladder resection, palliative radiation of left hip, 2 rounds of chemo, thyroid disorder on synthroid, CVA with residual expressive aphasia on Plavix and statin, HLD, vascular dementia, BPH s/p TURP who presents to [facility] ED with decreased appetite, PO intake, vomiting, weakness, dehydration, and sore throat. Patient was sent from Dr. Leblanc's office for dehydration and IV fluids. He was tachycardic and hypotension at Dr. Leblanc's office. All symptoms started after initiating immunotherapy infusion by Oncology Dr. Wiggins for his RCC 7 days ago. Patient sees Dr. Wiggins and had palliative radiation and chemo done.    Patient denies f/c/n/v/d/cp/sob. Symptoms started 7 days ago that gradually worsened. Initially patient and sister in room thought it was flu but symptoms kept worsening including general weakness and decreased PO intake. Endorses nasal congestion and discharge that improved on Claritin D. Endorses inability to keep food down even yogurt and gatorade. Reports weight loss of about 20 lbs since October 2022. Of note, patient moved here from [place] last year.    In the ED, VSS except P 110, R 20, /75. Labs remarkable for WBC 29.57, H/H 11.9/39.5, . Na 148, Cl 109, BUN/Cr 128/3.98. Lactic acid 2.2. Patient received NS 1L bolus, Zofran 4mg IV, Zosyn 4.5g. He is admitted for hospital medicine for  further management and care.       * No surgery found *      Hospital Course:   Patient is a 72yo male with a PMH of metastasized RCC (bone, liver) followed by Dr. Wiggins, dementia, CVA with residual expressive aphasia, HLD who presented to [facility] ED with vomiting, weakness, decrease PO intake, appetite, difficulty swallowing, dehydration s/p immunotherapy for RCC on and around 6/12/23. Patient was seen at Dr. Leblanc's clinic for his dysphagia. Patient was still eating fine and doing ADLs and hobbies such as boating until 1-2 weeks ago. Symptoms started and worsened since around 6/12 after the immunotherapy. WBC and Cr high upon admission. Patient received IV NS boluses 2L and maintenance fluids. He received IV Zosyn (6/26/23-6/28/23) for 3 days. CXR negative for acute findings. EKG NSR. Lactic acid 2.2, 2.4, 3.5, 2.7, 2.5. Speech therapist saw patient and he failed bedside swallow eval on 6/27/23. Speech recommended NPO diet and alternate route of feeding along with GI evaluation. NGT placed and small portion of tube feeds given. GI consulted and recommended Dex swish and spit along with PPI. EGD done on 6/28/23 showed grade A reflux esophagitis, mild gastritis, and likely mucositis from immunotherapy. No stricture, mass, bleeds were found. Biopsies taken. GI recommended speaking with Oncology. Dr. Wiggins was surprised of patient's symptoms s/p immunotherapy. He recommended Diflucan IV and fluid replacement. Symptoms could also be due to rapid decline of patient's RCC and prognosis is likely poor. Patient refused adamantly NGT (it came off overnight on 6/27/23) and PEG tube placement. We have discussed risks of aspiration on PO diet but he still refused NGT and PEG and expressed understanding. Patient has maximized benefits from this admission and will be discharged home. Dex swish and spit QID, Diflucan 200 QD, and Protonix 40 QD started. Stopped antibiotics and prednisone. Home medications restarted otherwise. He  received IV hypotonic saline, IV diflucan, and IV Zosyn before discharge. He was stable upon discharge and will be discharged home. He will followup with PCP in 1 week, Oncology on 7/10/23, and Nephrology in about 1 week.       Goals of Care Treatment Preferences:  Code Status: Full Code      Consults:   Consults (From admission, onward)        Status Ordering Provider     Inpatient consult to Registered Dietitian/Nutritionist  Once        Provider:  (Not yet assigned)    Completed JAILENE ALBERTS     Inpatient consult to Gastroenterology  Once        Provider:  Constantino Cardoso MD    Completed JAILENE ALBERTS     Inpatient consult to Registered Dietitian/Nutritionist  Once        Provider:  (Not yet assigned)    Completed JAILENE ALBERTS          ENT  * Mucositis after therapy  Immunotherapy for RCC on 6/12/23  Adamantly refused NGT (it came off overnight on 6/27/23) and PEG placement    GI consulted (appreciate assistance) and recommended Dex swish and spit along with PPI.   EGD done on 6/28/23 showed grade A reflux esophagitis, mild gastritis, and likely mucositis from immunotherapy. No stricture, mass, bleeds were found. Biopsies taken.   Oncology Dr. Wiggins contacted and was surprised of patient's symptoms s/p immunotherapy since it's rare. He recommended Diflucan IV and fluid replacement. Symptoms could also be due to rapid decline of patient's RCC and prognosis is likely poor. We have discussed risks of aspiration on PO diet but he still refused NGT and PEG and expressed understanding.   Dex swish and spit QID, Diflucan 200 QD, and Protonix 40 QD started. Stopped antibiotics and prednisone. Home medications restarted otherwise. He received IV hypotonic saline, IV diflucan, and IV Zosyn before discharge. He was stable upon discharge and will be discharged home. He will followup with PCP in 1 week, Oncology on 7/10/23, and Nephrology in about 1 week.      Renal/  BPH (benign prostatic hyperplasia)  S/p TURP  Home  finasteride 5 qd, Tamsulosin 0.4 qd    Consider OP Urology f/u - need to schedule at PCP's appointment f/u    ALEXANDER (acute kidney injury)  Patient with acute kidney injury/acute renal failure likely due to pre-renal azotemia due to dehydration ALEXANDER is currently worsening. Baseline creatinine 1.48 on 12/29/22. - Labs reviewed- Renal function/electrolytes with Estimated Creatinine Clearance: 15.5 mL/min (A) (based on SCr of 4.03 mg/dL (H)). according to latest data. Monitor urine output and serial BMP and adjust therapy as needed. Avoid nephrotoxins and renally dose meds for GFR listed above.    Baseline Cr 1.48 and GFR 50 on 12/29/22  BUN/Cr = 32 2/2 dehydration and decreased PO intake s/p immunotherapy 1 week ago for RCC  Telemetry  IV half NS@100cc/hr for 2 days (maintenace rate)  Avoid nephrotoxic drugs  Renal dose medications  Trend BMP  Consider UA, urine electrolytes, urine protein/Cr if no improvement    6/27: Cr 3.95 mildly improving. UA and bladder scan pending. Patient reports he's urinating fine. After 2L NS boluses last night, had half NS for hours but switched to D5W@115cc/hr due to Na and Cl at 152 and 114. Monitor BMP and UOP.   6/28: see mucositis. F/u Nephro OP    Oncology  History of renal cell carcinoma  Sees Dr. Wiggins Oncology OP    F/u Oncology 7/10/23 OP    GI  Gastritis  See mucositis      Difficulty swallowing  patient hasn't really been able to eat and swallow the clear liquid diet.   Speech eval recommends NPO due to inability to swallow, alternate route for feeding, and GI evaluation.   GI consulted for potential EGD with dilation vs PEG placement, appreciate assistance  Discussed with patient for NGT vs PEG placement, he agrees to having NGT.  NGT pending and diet consulted for feeding. Will feed and give medications through NGT.     See mucositis        Final Active Diagnoses:    Diagnosis Date Noted POA    PRINCIPAL PROBLEM:  Mucositis after therapy [K12.30] 06/28/2023 Yes    ALEXANDER (acute  kidney injury) [N17.9] 06/26/2023 Yes    Difficulty swallowing [R13.10] 06/27/2023 Yes    Reflux esophagitis [K21.00] 06/28/2023 Yes    Gastritis [K29.70] 06/28/2023 Yes    Dehydration [E86.0] 06/26/2023 Yes    History of renal cell carcinoma [Z85.528] 10/18/2022 Not Applicable    Leukocytosis [D72.829] 06/26/2023 Yes    Moderate protein-calorie malnutrition [E44.0] 06/26/2023 Yes    Acute-on-chronic kidney injury [N17.9, N18.9] 06/26/2023 Yes    Dyslipidemia [E78.5] 10/18/2022 Yes    Hypothyroidism [E03.9] 10/18/2022 Yes    History of stroke [Z86.73] 10/18/2022 Not Applicable    BPH (benign prostatic hyperplasia) [N40.0] 06/26/2023 Yes      Problems Resolved During this Admission:       Discharged Condition: stable    Disposition: Home or Self Care    Follow Up:   Follow-up Information     Chito Leblanc, DO Follow up in 1 week(s).    Specialty: Critical Care Medicine  Why: hospital f/u  Contact information:  [address]  [place] [address]  [phone]             Gregorio Wiggins MD Follow up in 12 day(s).    Specialty: Hematology and Oncology  Why: hospital f/u as scheduled (no need to make appointment unless pt wants an earlier date) on 7/10/23 with Dr. Wiggins Oncologist  Contact information:  [address]  [address] [place] [address]  [phone]             Noemi Cardoso, DO Follow up in 1 week(s).    Specialty: Nephrology  Why: hospital f/u. Pt does not have nephrologist and needs follow up with nephrology. ALEXANDER/increased Cr from dehydration and difficulty swallowing s/p immunotherapy for metastatic renal cell carcinoma. Pt is s/p nephrectomy and Cr 4.03 upon discharge. Baseline 1.48 5 months ago. Renal U/S performed and final read pending.  Contact information:  [address] [place] [address]  [phone]                       Patient Instructions:      Diet clear liquid     Notify your health care provider if you experience any of the following:  persistent nausea and vomiting  or diarrhea     Notify your health care provider if you experience any of the following:  temperature >100.4     Notify your health care provider if you experience any of the following:  persistent dizziness, light-headedness, or visual disturbances     Notify your health care provider if you experience any of the following:  increased confusion or weakness     Activity as tolerated       Significant Diagnostic Studies: Labs: All labs within the past 24 hours have been reviewed    Pending Diagnostic Studies:     Procedure Component Value Units Date/Time    EKG 12-lead [[id number]] Collected: 06/26/23 1725    Order Status: Sent Lab Status: In process Updated: 06/27/23 0548    Narrative:      Test Reason : R07.9,    Vent. Rate : 088 BPM     Atrial Rate : 088 BPM     P-R Int : 130 ms          QRS Dur : 088 ms      QT Int : 380 ms       P-R-T Axes : 064 -56 070 degrees     QTc Int : 459 ms    Normal sinus rhythm  Left axis deviation  Abnormal ECG  No previous ECGs available    Referred By: [email or web address]   SELF           Confirmed By:     EXTRA TUBES [[id number]] Collected: 06/26/23 1900    Order Status: Sent Lab Status: In process Updated: 06/26/23 1910    Specimen: Blood, Venous     Narrative:      The following orders were created for panel order EXTRA TUBES.  Procedure                               Abnormality         Status                     ---------                               -----------         ------                     Light Green Top Hold[[id number]]                             In process                   Please view results for these tests on the individual orders.    EXTRA TUBES [[id number]] Collected: 06/26/23 1237    Order Status: Sent Lab Status: In process Updated: 06/26/23 1237    Specimen: Blood, Venous     Narrative:      The following orders were created for panel order EXTRA TUBES.  Procedure                               Abnormality         Status                     ---------                                -----------         ------                     Light Blue Top Hold[[id number]]                              In process                 Red Top Hold[[id number]]                                     In process                   Please view results for these tests on the individual orders.    Urinalysis, Reflex to Urine Culture [[id number]]     Order Status: Sent Lab Status: No result     Specimen: Urine          Medications:  Reconciled Home Medications:      Medication List      START taking these medications    dexAMETHasone 0.5 mg/5 mL Elix  Commonly known as: DECADRON  Take 10 mLs (1 mg total) by mouth 4 (four) times daily. for 20 days     fluconazole 200 MG Tab  Commonly known as: DIFLUCAN  Take 1 tablet (200 mg total) by mouth once daily. for 21 days     pantoprazole 40 MG tablet  Commonly known as: PROTONIX  Take 1 tablet (40 mg total) by mouth once daily.        CONTINUE taking these medications    acetaminophen 325 MG tablet  Commonly known as: TYLENOL  Take 325 mg by mouth every 6 (six) hours as needed for Pain.     alfuzosin 10 mg Tb24  Commonly known as: UROXATRAL  Take 1 tablet (10 mg total) by mouth daily with breakfast.     atorvastatin 80 MG tablet  Commonly known as: LIPITOR  Take 80 mg by mouth once daily.     calcium-mag oxide-vitamin D3 185- mg-mg-unit Cap  Take 1 tablet by mouth once daily.     clopidogreL 75 mg tablet  Commonly known as: PLAVIX  Take 1 tablet (75 mg total) by mouth once daily.     finasteride 5 mg tablet  Commonly known as: PROSCAR  Take 1 tablet (5 mg total) by mouth once daily.     * levothyroxine 150 MCG tablet  Commonly known as: SYNTHROID  Take 1 tablet (150 mcg total) by mouth every other day.     * levothyroxine 137 MCG Tab tablet  Commonly known as: SYNTHROID  Take 1 tablet (137 mcg total) by mouth every other day.     ondansetron 8 MG tablet  Commonly known as: ZOFRAN  TAKE 1 TABLET BY MOUTH EVERY 6 HOURS AS NEEDED FOR NAUSEA OR VOMITING     potassium Cl-calcium  phos-mag 40-18-9 mg Tab  Take 1 tablet by mouth once daily.     zinc gluconate 50 mg tablet  Take 50 mg by mouth once daily.         * This list has 2 medication(s) that are the same as other medications prescribed for you. Read the directions carefully, and ask your doctor or other care provider to review them with you.            STOP taking these medications    azithromycin 250 MG tablet  Commonly known as: Z-MILVIA     cephALEXin 500 MG capsule  Commonly known as: KEFLEX     predniSONE 20 MG tablet  Commonly known as: DELTASONE            Indwelling Lines/Drains at time of discharge:   Lines/Drains/Airways     Central Venous Catheter Line  Duration           Port A Cath Single Lumen Subclavian Right -- days                Time spent on the discharge of patient: 45 minutes         Atif Valdez DO  Department of Hospital Medicine  [facility]

## 2023-06-28 NOTE — DISCHARGE INSTRUCTIONS
Start taking:  Dexamethasone swish and swallow 4 times daily for up to 20 days until symptoms improve for immunotherapy-induced mucositis  Diflucan 200mg once daily for potential fungal esophagitis  Protonix 40mg once daily for reflux esophagitis and mild gastritis    Stop taking:  Zpak  Keflex  Prednisone    Continue other home medications as tolerated.  Followup with PCP in 1 week, Oncology Dr. Wiggins on 7/10/23, and Nephrology in about 1 week.

## 2023-06-28 NOTE — PLAN OF CARE
Problem: Physical Therapy  Goal: Physical Therapy Goal  Description: Short Term Goals to be met by: 2023    Patient will increase functional independence with mobility by performin. Supine to sit with independently  2. Sit to stand transfer with independently using Rolling walker  3. Bed to chair transfer with independently using Rolling walker  4. Gait  x 100 feet with independently using Rolling walker  5. Lower extremity exercise program x30 reps per handout, with assistance as needed    Long Term Goals to be met by: 2023    Pt will regain full independent functional mobility with lowest level of assistive device to return to home situation and prior activities of daily living.   Outcome: Ongoing, Progressing     Patient participated well with PT evaluation but fatigued easily. Patient plans to return home with family assist at d/c and will benefit from home health PT follow up.

## 2023-06-28 NOTE — ANESTHESIA POSTPROCEDURE EVALUATION
Anesthesia Post Evaluation    Patient: Kerwin Wilson    Procedure(s) Performed: EGD    Final Anesthesia Type: general      Patient location during evaluation: GI PACU  Patient participation: Yes- Able to Participate  Level of consciousness: awake and alert and oriented  Post-procedure vital signs: reviewed and stable  Pain management: adequate  Airway patency: patent    PONV status at discharge: No PONV  Anesthetic complications: no      Cardiovascular status: blood pressure returned to baseline and stable  Respiratory status: unassisted, spontaneous ventilation and room air  Hydration status: euvolemic  Follow-up not needed.          Vitals Value Taken Time   /76 06/28/23 1136   Temp 36.8 °C (98.2 °F) 06/28/23 1136   Pulse 99 06/28/23 1136   Resp 20 06/28/23 1136   SpO2 99 % 06/28/23 1136         No case tracking events are documented in the log.      Pain/Skye Score: No data recorded

## 2023-06-29 ENCOUNTER — PATIENT OUTREACH (OUTPATIENT)
Dept: ADMINISTRATIVE | Facility: CLINIC | Age: 73
End: 2023-06-29

## 2023-06-29 LAB
ESTROGEN SERPL-MCNC: NORMAL PG/ML
INSULIN SERPL-ACNC: NORMAL U[IU]/ML
LAB AP GROSS DESCRIPTION: NORMAL
LAB AP LABORATORY NOTES: NORMAL
T3RU NFR SERPL: NORMAL %

## 2023-06-29 NOTE — PLAN OF CARE
[facility] Stay [facility]  Discharge Final Note    Primary Care Provider: Chito Leblanc DO    Expected Discharge Date: 6/28/2023    Final Discharge Note (most recent)       Final Note - 06/29/23 0905          Final Note    Assessment Type Final Discharge Note     Anticipated Discharge Disposition Home or Self Care        Post-Acute Status    Discharge Delays None known at this time                   Patient discharged home on 6/28/23.    Important Message from Medicare  Important Message from Medicare regarding Discharge Appeal Rights: Given to patient/caregiver, Explained to patient/caregiver, Signed/date by patient/caregiver     Date IMM was signed: 06/28/23  Time IMM was signed: 1105    Contact Info       Chito Leblanc DO   Specialty: Critical Care Medicine   Relationship: PCP - General    [address]  [place] [address]   Phone: [phone]       Next Steps: Follow up in 1 week(s)    Instructions: hospital f/u    Gregorio Wiggins MD   Specialty: Hematology and Oncology    [address]  [address] [place] [address]   Phone: [phone]       Next Steps: Follow up in 12 day(s)    Instructions: hospital f/u as scheduled (no need to make appointment unless pt wants an earlier date) on 7/10/23 with Dr. Wiggins Oncologist    Noemi Cardoso DO   Specialty: Nephrology    [address] [facility] [address]   Phone: [phone]       Next Steps: Follow up in 1 week(s)    Instructions: hospital f/u. Pt does not have nephrologist and needs follow up with nephrology. ALEXANDER/increased Cr from dehydration and difficulty swallowing s/p immunotherapy for metastatic renal cell carcinoma. Pt is s/p nephrectomy and Cr 4.03 upon discharge. Baseline 1.48 5 months ago. Renal U/S performed and final read pending.

## 2023-06-29 NOTE — PROGRESS NOTES
C3 nurse spoke with sister Lenore Brar for a TCC post hospital discharge follow up call. The patient has a scheduled HOSFU appointment with Chito Leblanc DO  on 7/13/23.

## 2023-06-29 NOTE — PROGRESS NOTES
C3 nurse attempted to contact Kerwin Wilson  for a TCC post hospital discharge follow up call. No answer. Left voicemail with callback information. The patient does not have a scheduled HOSFU appointment. Message sent to PCP staff for assistance with scheduling visit with patient.

## 2023-06-30 NOTE — PHYSICIAN QUERY
PT Name: Kerwin Wilson  MR #: [id number]     Documentation Clarification      CDS/: SUNSHINE KEVIN MSN RN           Contact information:[email or web address]    This form is a permanent document in the medical record.     Query Date: June 30, 2023    By submitting this query, we are merely seeking further clarification of documentation. Please utilize your independent clinical judgment when addressing the question(s) below.    The Medical Record reflects the following:    Clinical Findings Location in Medical Records   Moderate protein-calorie malnutrition  Nutrition consulted. Most recent weight and BMI monitored-      Measurements:      Wt Readings from Last 1 Encounters:   06/26/23 68 kg (150 lb)   Body mass index is 20.34 kg/m².   H&P, Dr. Valdez, 06/26   If patient unable to eat po foods/fluids recommend start alternate routes of nutrition. Consult RD as needed.      Patient with severe weight loss of 9.1% x 1 month with less than 75% of energy intake x 1 month.     NFPE findings:  Severe fat depletion noted in orbital, upper arm and thoracic and lumbar regions     Severe muscle depletion noted in temple, clavical bone, scapular bone and patellar regions.      Moderate depletion of muscle noted in dorsal hand region.      Patient meets ASPEN criteria fopr Severe Protein-calorie Malnutrition.     Nutrition STEW, Tony RD, 06/27   Moderate protein-calorie malnutrition  Nutrition consulted. Most recent weight and BMI monitored-      Measurements:      Wt Readings from Last 1 Encounters:   06/26/23 68 kg (150 lb)   Body mass index is 20.34 kg/m².     Patient has been screened and assessed by RD      Malnutrition Type:  Context: chronic illness  Level:       Malnutrition Characteristic Summary:  Weight Loss (Malnutrition): greater than 5% in 1 month  Energy Intake (Malnutrition): less than or equal to 75% for greater than or equal to 1 month  Subcutaneous Fat (Malnutrition): severe depletion  Muscle Mass  (Malnutrition): severe depletion    PN, Dr. Valdez, 06/27   Final Active Diagnoses:  Moderate protein-calorie malnutrition PN, Dr. Valdez, 06/28         Due to the conflicting clinical picture, please clarify the diagnosis of Severe Malnutrition .        [  X ] Severe Malnutrition only, moderate malnutrition ruled out     [   ] Moderate Malnutrition   [   ] Other clarification (please specify): ___________________       Form No. [id number]

## 2023-07-02 LAB
BACTERIA BLD CULT: NORMAL
BACTERIA BLD CULT: NORMAL

## 2023-07-07 NOTE — ASSESSMENT & PLAN NOTE
Child Life Programming: [employer]    Patient received a bedside visit from [employer] magician (Alexander SHEPPARD).    Jeanine Hall  Child Life Specialist  21-TOYS ()   S/p TURP  Home finasteride 5 qd, Tamsulosin 0.4 qd     done

## 2023-07-26 RX ORDER — PANTOPRAZOLE SODIUM 40 MG/1
TABLET, DELAYED RELEASE ORAL
Qty: 90 TABLET | Refills: 3 | Status: SHIPPED | OUTPATIENT
Start: 2023-07-26 | End: 2023-08-03

## 2023-08-03 RX ORDER — PANTOPRAZOLE SODIUM 40 MG/1
TABLET, DELAYED RELEASE ORAL
Qty: 90 TABLET | Refills: 3 | Status: SHIPPED | OUTPATIENT
Start: 2023-08-03

## 2023-10-02 PROBLEM — N17.9 AKI (ACUTE KIDNEY INJURY): Status: RESOLVED | Noted: 2023-06-26 | Resolved: 2023-10-02

## 2023-10-02 PROBLEM — N18.9 ACUTE-ON-CHRONIC KIDNEY INJURY: Status: RESOLVED | Noted: 2023-06-26 | Resolved: 2023-10-02

## 2023-10-02 PROBLEM — N17.9 ACUTE-ON-CHRONIC KIDNEY INJURY: Status: RESOLVED | Noted: 2023-06-26 | Resolved: 2023-10-02

## 2024-01-09 DIAGNOSIS — Z71.89 COMPLEX CARE COORDINATION: ICD-10-CM
